# Patient Record
Sex: MALE | Race: BLACK OR AFRICAN AMERICAN | Employment: FULL TIME | URBAN - METROPOLITAN AREA
[De-identification: names, ages, dates, MRNs, and addresses within clinical notes are randomized per-mention and may not be internally consistent; named-entity substitution may affect disease eponyms.]

---

## 2017-01-12 ENCOUNTER — APPOINTMENT (OUTPATIENT)
Dept: GENERAL RADIOLOGY | Age: 52
End: 2017-01-12
Attending: EMERGENCY MEDICINE
Payer: COMMERCIAL

## 2017-01-12 ENCOUNTER — HOSPITAL ENCOUNTER (EMERGENCY)
Age: 52
Discharge: HOME | End: 2017-01-13
Attending: EMERGENCY MEDICINE
Payer: COMMERCIAL

## 2017-01-12 VITALS
TEMPERATURE: 98 F | WEIGHT: 295 LBS | SYSTOLIC BLOOD PRESSURE: 137 MMHG | BODY MASS INDEX: 34.83 KG/M2 | HEIGHT: 77 IN | OXYGEN SATURATION: 97 % | RESPIRATION RATE: 14 BRPM | HEART RATE: 60 BPM | DIASTOLIC BLOOD PRESSURE: 93 MMHG

## 2017-01-12 DIAGNOSIS — M19.90 ARTHRITIS: Primary | ICD-10-CM

## 2017-01-12 PROCEDURE — 73564 X-RAY EXAM KNEE 4 OR MORE: CPT

## 2017-01-12 PROCEDURE — 99283 EMERGENCY DEPT VISIT LOW MDM: CPT

## 2017-01-12 RX ORDER — IBUPROFEN 400 MG/1
800 TABLET ORAL
Status: COMPLETED | OUTPATIENT
Start: 2017-01-12 | End: 2017-01-13

## 2017-01-12 RX ORDER — IBUPROFEN 600 MG/1
600 TABLET ORAL
Qty: 30 TAB | Refills: 0 | Status: SHIPPED | OUTPATIENT
Start: 2017-01-12

## 2017-01-13 PROCEDURE — 74011250637 HC RX REV CODE- 250/637: Performed by: EMERGENCY MEDICINE

## 2017-01-13 RX ADMIN — IBUPROFEN 800 MG: 400 TABLET ORAL at 00:06

## 2017-01-13 NOTE — ED NOTES
I have reviewed discharge instructions with the patient. The patient verbalized understanding. Medication teaching given, to include name, dose, action, and side effects. Patient verbalized understanding of medications. Encouraged patient to voice any concerns with reassurance provided. Patient armband removed and shredded    Patient Discharged in stable condition.   Patient is awake, alert and oriented x 4

## 2017-01-13 NOTE — DISCHARGE INSTRUCTIONS
Arthritis: Care Instructions  Your Care Instructions  Arthritis, also called osteoarthritis, is a breakdown of the cartilage that cushions your joints. When the cartilage wears down, your bones rub against each other. This causes pain and stiffness. Many people have some arthritis as they age. Arthritis most often affects the joints of the spine, hands, hips, knees, or feet. You can take simple measures to protect your joints, ease your pain, and help you stay active. Follow-up care is a key part of your treatment and safety. Be sure to make and go to all appointments, and call your doctor if you are having problems. It's also a good idea to know your test results and keep a list of the medicines you take. How can you care for yourself at home? · Stay at a healthy weight. Being overweight puts extra strain on your joints. · Talk to your doctor or physical therapist about exercises that will help ease joint pain. ¨ Stretch. You may enjoy gentle forms of yoga to help keep your joints and muscles flexible. ¨ Walk instead of jog. Other types of exercise that are less stressful on the joints include riding a bicycle, swimming, or water exercise. ¨ Lift weights. Strong muscles help reduce stress on your joints. Stronger thigh muscles, for example, take some of the stress off of the knees and hips. Learn the right way to lift weights so you do not make joint pain worse. · Take your medicines exactly as prescribed. Call your doctor if you think you are having a problem with your medicine. · Take pain medicines exactly as directed. ¨ If the doctor gave you a prescription medicine for pain, take it as prescribed. ¨ If you are not taking a prescription pain medicine, ask your doctor if you can take an over-the-counter medicine. · Use a cane, crutch, walker, or another device if you need help to get around. These can help rest your joints.  You also can use other things to make life easier, such as a higher toilet seat and padded handles on kitchen utensils. · Do not sit in low chairs, which can make it hard to get up. · Put heat or cold on your sore joints as needed. Use whichever helps you most. You also can take turns with hot and cold packs. ¨ Apply heat 2 or 3 times a day for 20 to 30 minutes--using a heating pad, hot shower, or hot pack--to relieve pain and stiffness. ¨ Put ice or a cold pack on your sore joint for 10 to 20 minutes at a time. Put a thin cloth between the ice and your skin. When should you call for help? Call your doctor now or seek immediate medical care if:  · You have sudden swelling, warmth, or pain in any joint. · You have joint pain and a fever or rash. · You have such bad pain that you cannot use a joint. Watch closely for changes in your health, and be sure to contact your doctor if:  · You have mild joint symptoms that continue even with more than 6 weeks of care at home. · You have stomach pain or other problems with your medicine. Where can you learn more? Go to http://pilar-saurav.info/. Enter N343 in the search box to learn more about \"Arthritis: Care Instructions. \"  Current as of: February 24, 2016  Content Version: 11.1  © 5854-4184 [employer]. Care instructions adapted under license by [employer] (which disclaims liability or warranty for this information). If you have questions about a medical condition or this instruction, always ask your healthcare professional. [place] [address] any warranty or liability for your use of this information.

## 2017-01-13 NOTE — ED PROVIDER NOTES
HPI Comments: 11:52 PM Armand Dakin is a 54 y.o. male  presents to ED complaining of R knee pain that began a few weeks ago. Pt states that he was exercising on the treadmill, elliptical, and bikes and when finished his workout he noticed he R knee was in pain. Pt states that he has been taking Aleve and Tylenol but that it has provided little relief. Pt states that the pain is not that bad and is mostly exacerbated when his knee rest in a certain position for a long period of time. There are no further complaints at this time. PCP: Yair Vang MD      Patient is a 54 y.o. male presenting with knee pain. The history is provided by the patient. No  was used. Knee Pain    The problem has not changed since onset. Past Medical History:   Diagnosis Date    [facility] [address]     Atypical chest pain     Hypertension     Hypokalemia     Renal cell carcinoma of right kidney ([facility] [address] 12/23/14    Renal mass, right        Past Surgical History:   Procedure Laterality Date    Hx wisdom teeth extraction      Hx nephrectomy Right 12/23/14     Open Partial, Dr. Cintia Kaye, [facility]         Family History:   Problem Relation Age of Onset    Cancer Maternal Grandfather      Prostate    Diabetes Maternal Grandmother     Hypertension Mother        Social History     Social History    Marital status:      Spouse name: N/A    Number of children: N/A    Years of education: N/A     Occupational History    Not on file.      Social History Main Topics    Smoking status: Former Smoker     Packs/day: 0.05     Years: 1.00     Types: Cigarettes    Smokeless tobacco: Never Used    Alcohol use 0.0 - 0.5 oz/week     0 - 1 Standard drinks or equivalent per week      Comment: occasionally    Drug use: No    Sexual activity: Yes     Partners: Female     Other Topics Concern    Not on file     Social History Narrative         ALLERGIES: Gadolinium-containing contrast media    Review of Systems   Constitutional: Negative. HENT: Negative. Eyes: Negative. Respiratory: Negative. Cardiovascular: Negative. Gastrointestinal: Negative. Endocrine: Negative. Genitourinary: Negative. Musculoskeletal: Positive for arthralgias (R knee). Skin: Negative. Allergic/Immunologic: Negative. Neurological: Negative. Hematological: Negative. Psychiatric/Behavioral: Negative. All other systems reviewed and are negative. Vitals:    01/12/17 2322   BP: (!) 137/93   Pulse: 60   Resp: 14   Temp: 98 °F (36.7 °C)   SpO2: 97%   Weight: 133.8 kg (295 lb)   Height: 6' 5\" (1.956 m)            Physical Exam   Constitutional: He is oriented to person, place, and time. He appears well-developed and well-nourished. No distress. HENT:   Head: Normocephalic. Mouth/Throat: Oropharynx is clear and moist.   Eyes: Conjunctivae and EOM are normal. Pupils are equal, round, and reactive to light. Neck: Normal range of motion. Neck supple. Cardiovascular: Normal rate, regular rhythm, normal heart sounds and intact distal pulses. No murmur heard. Pulmonary/Chest: Effort normal and breath sounds normal. No respiratory distress. He has no wheezes. He has no rales. He exhibits no tenderness. Abdominal: Soft. Bowel sounds are normal. He exhibits no distension. There is no tenderness. There is no rebound. Musculoskeletal: He exhibits no edema or tenderness. Right knee: He exhibits normal range of motion, no swelling and no erythema. RIGHT KNEE: normal ROM, pulses, sensory and strength. No edema, tenderness with palpation or rash. Neurological: He is alert and oriented to person, place, and time. No cranial nerve deficit. He exhibits normal muscle tone. Coordination normal.   Skin: Skin is warm and dry. No rash noted. Psychiatric: He has a normal mood and affect.  His behavior is normal. Judgment and thought content normal.   Nursing note and vitals reviewed. MDM  Number of Diagnoses or Management Options  Arthritis: new and requires workup  Risk of Complications, Morbidity, and/or Mortality  Presenting problems: moderate  Diagnostic procedures: moderate  Management options: moderate      ED Course       Procedures        Vitals:  Patient Vitals for the past 12 hrs:   Temp Pulse Resp BP SpO2   01/12/17 2322 98 °F (36.7 °C) 60 14 (!) 137/93 97 %     Pulse ox interpreted WNL    Medications ordered:   Medications   ibuprofen (MOTRIN) tablet 800 mg (not administered)         Lab findings:  No results found for this or any previous visit (from the past 12 hour(s)). X-Ray, CT or other radiology findings or impressions:  XR KNEE RT MIN 4 V    (Results Pending)         Disposition:  Diagnosis:   1. Arthritis        Disposition: discharge    Follow-up Information     None           Patient's Medications   Start Taking    IBUPROFEN (MOTRIN) 600 MG TABLET    Take 1 Tab by mouth every six (6) hours as needed for Pain. Continue Taking    AMLODIPINE (NORVASC) 10 MG TABLET    Take  by mouth daily. ASPIRIN 81 MG CHEWABLE TABLET    Take 1 Tab by mouth daily. These Medications have changed    No medications on file   Stop Taking    ATENOLOL (TENORMIN) 50 MG TABLET    Take 100 mg by mouth daily. BENAZEPRIL (LOTENSIN) 40 MG TABLET    Take 40 mg by mouth daily. Scribe Attestation  Kelvin Finch acting as a scribe for and in the presence of Chuck Burnett MD  January 13, 2017 at 12:00 AM       Signed By: Yanelis Boyd, January 13, 2017 at 12:00 AM       Provider Attestation:  I personally performed the services described in the documentation, reviewed the documentation, as recorded by the scribe in my presence, and it accurately and completely records my words and actions.   Chuck Burnett MD

## 2017-01-29 ENCOUNTER — HOSPITAL ENCOUNTER (EMERGENCY)
Age: 52
Discharge: HOME | End: 2017-01-29
Attending: EMERGENCY MEDICINE | Admitting: EMERGENCY MEDICINE
Payer: COMMERCIAL

## 2017-01-29 VITALS
OXYGEN SATURATION: 99 % | DIASTOLIC BLOOD PRESSURE: 84 MMHG | SYSTOLIC BLOOD PRESSURE: 138 MMHG | HEIGHT: 77 IN | HEART RATE: 61 BPM | TEMPERATURE: 98 F | WEIGHT: 295 LBS | BODY MASS INDEX: 34.83 KG/M2 | RESPIRATION RATE: 16 BRPM

## 2017-01-29 DIAGNOSIS — K64.8 INTERNAL BLEEDING HEMORRHOIDS: Primary | ICD-10-CM

## 2017-01-29 PROCEDURE — 99282 EMERGENCY DEPT VISIT SF MDM: CPT

## 2017-01-29 RX ORDER — NAPROXEN 500 MG/1
500 TABLET ORAL 2 TIMES DAILY WITH MEALS
Qty: 20 TAB | Refills: 0 | Status: SHIPPED | OUTPATIENT
Start: 2017-01-29 | End: 2017-02-08

## 2017-01-29 NOTE — ED TRIAGE NOTES
Hemorrhoid - using preparation H. This morning blood on underwear - bright red, about the 3\" in diameter.

## 2017-01-29 NOTE — DISCHARGE INSTRUCTIONS

## 2017-01-29 NOTE — ED PROVIDER NOTES
HPI Comments: Patient is a 55 y/o male who presents to the ER c/o rectal bleeding. Patient states for the past week or so, he has had problems with hemorrhoids. Patient was seen by his PCP, and told to use preparation as needed. He has still been having some bleeding/spotting noted in his underwear regularly. Patient states he is having soft, regular bowel movements that are not painful. He denied any blood in his stool. He states the hemorrhoids are not very painful or itchy. Patient denied all other complaints. Patient is a 54 y.o. male presenting with hemorrhoids. The history is provided by the patient. Hemorrhoids    This is a new problem. The current episode started more than 1 week ago. Pertinent negatives include no abdominal pain, no dysuria, no chills, no fever, no nausea and no vomiting. Past Medical History:   Diagnosis Date    [facility]) ([facility] [address]     Atypical chest pain     Hypertension     Hypokalemia     Renal cell carcinoma of right kidney ([facility] [address] 12/23/14    Renal mass, right        Past Surgical History:   Procedure Laterality Date    Hx wisdom teeth extraction      Hx nephrectomy Right 12/23/14     [place] [id number]         Family History:   Problem Relation Age of Onset    Cancer Maternal Grandfather      Prostate    Diabetes Maternal Grandmother     Hypertension Mother        Social History     Social History    Marital status:      Spouse name: N/A    Number of children: N/A    Years of education: N/A     Occupational History    Not on file.      Social History Main Topics    Smoking status: Former Smoker     Packs/day: 0.05     Years: 1.00     Types: Cigarettes    Smokeless tobacco: Never Used    Alcohol use 0.0 - 0.5 oz/week     0 - 1 Standard drinks or equivalent per week      Comment: occasionally    Drug use: No    Sexual activity: Yes     Partners: Female     Other Topics Concern    Not on file     Social History Narrative         ALLERGIES: Gadolinium-containing contrast media    Review of Systems   Constitutional: Negative for chills, fatigue and fever. HENT: Negative. Negative for sore throat. Eyes: Negative. Respiratory: Negative for cough and shortness of breath. Cardiovascular: Negative for chest pain and palpitations. Gastrointestinal: Positive for anal bleeding, hemorrhoids and rectal pain. Negative for abdominal pain, nausea and vomiting. Genitourinary: Negative for dysuria. Musculoskeletal: Negative. Skin: Negative. Neurological: Negative for dizziness, weakness, light-headedness and headaches. Psychiatric/Behavioral: Negative. All other systems reviewed and are negative. Vitals:    01/29/17 1240   BP: 138/84   Pulse: 61   Resp: 16   Temp: 98 °F (36.7 °C)   SpO2: 99%   Weight: 133.8 kg (295 lb)   Height: 6' 5\" (1.956 m)            Physical Exam   Constitutional: He is oriented to person, place, and time. He appears well-developed and well-nourished. HENT:   Head: Normocephalic and atraumatic. Mouth/Throat: Oropharynx is clear and moist.   Eyes: Conjunctivae are normal. No scleral icterus. Neck: Neck supple. No JVD present. No tracheal deviation present. Cardiovascular: Normal rate, regular rhythm and normal heart sounds. Pulmonary/Chest: Effort normal and breath sounds normal. No respiratory distress. He has no wheezes. Abdominal: Soft. There is no tenderness. Genitourinary: Rectal exam shows internal hemorrhoid. Genitourinary Comments: Suspected grade 2/3 internal hemorrhoid visualized on anal exam with scant bleeding; non tender to touch   Musculoskeletal: Normal range of motion. Neurological: He is alert and oriented to person, place, and time. He has normal strength. Gait normal.   Skin: Skin is warm and dry. Psychiatric: He has a normal mood and affect. Nursing note and vitals reviewed.        MDM  Number of Diagnoses or Management Options  Diagnosis management comments: 2:12 PM  53 y/o male c/o rectal bleeding/new onset of hemorrhoids. Pt seen at pcp last week and has been using preparation H as needed with little relief. Has not had colonoscopy yet. Will give referral.  All questions answered and patient in agreement with plan of care. Will plan for discharge. Gregor Galeano PA-C    Clinical Impression:  Internal hemorrhoids    Risk of Complications, Morbidity, and/or Mortality  Presenting problems: low  Diagnostic procedures: low  Management options: low    Critical Care  Total time providing critical care: < 30 minutes    Patient Progress  Patient progress: stable    ED Course       Procedures             I was personally available for consultation in the emergency department. I have reviewed the chart prior to the patient's discharge and agree with the documentation recorded by the [facility], including the assessment, treatment plan, and disposition.

## 2017-04-24 ENCOUNTER — OFFICE VISIT (OUTPATIENT)
Dept: ORTHOPEDIC SURGERY | Facility: CLINIC | Age: 52
End: 2017-04-24

## 2017-04-24 VITALS
TEMPERATURE: 98 F | SYSTOLIC BLOOD PRESSURE: 143 MMHG | DIASTOLIC BLOOD PRESSURE: 89 MMHG | HEART RATE: 70 BPM | WEIGHT: 300 LBS | BODY MASS INDEX: 35.57 KG/M2

## 2017-04-24 DIAGNOSIS — M22.41 CHONDROMALACIA PATELLAE OF RIGHT KNEE: ICD-10-CM

## 2017-04-24 DIAGNOSIS — M25.461 KNEE EFFUSION, RIGHT: ICD-10-CM

## 2017-04-24 DIAGNOSIS — M25.561 RIGHT KNEE PAIN, UNSPECIFIED CHRONICITY: ICD-10-CM

## 2017-04-24 DIAGNOSIS — M17.11 PRIMARY OSTEOARTHRITIS OF RIGHT KNEE: Primary | ICD-10-CM

## 2017-04-24 DIAGNOSIS — E66.9 OBESITY (BMI 30.0-34.9): ICD-10-CM

## 2017-04-24 RX ORDER — BUPIVACAINE HYDROCHLORIDE 2.5 MG/ML
4 INJECTION, SOLUTION EPIDURAL; INFILTRATION; INTRACAUDAL ONCE
Qty: 4 ML | Refills: 0
Start: 2017-04-24 | End: 2017-04-24

## 2017-04-24 RX ORDER — BETAMETHASONE SODIUM PHOSPHATE AND BETAMETHASONE ACETATE 3; 3 MG/ML; MG/ML
3 INJECTION, SUSPENSION INTRA-ARTICULAR; INTRALESIONAL; INTRAMUSCULAR; SOFT TISSUE ONCE
Qty: 0.5 ML | Refills: 0
Start: 2017-04-24 | End: 2017-04-24

## 2017-04-24 NOTE — MR AVS SNAPSHOT
Visit Information Date & Time Provider Department Dept. Phone Encounter #  
 4/24/2017 10:00 AM Je Moses, [address] [facility] [place] [address] [phone] [id number] Follow-up Instructions Return if symptoms worsen or fail to improve. Upcoming Health Maintenance Date Due Pneumococcal 19-64 Highest Risk (1 of 3 - PCV13) 5/3/1986 DTaP/Tdap/Td series (1 - Tdap) 5/3/1988 INFLUENZA AGE 9 TO ADULT 8/1/2016 Allergies as of 4/24/2017  Review Complete On: 4/24/2017 By: Meenu Kwok Severity Noted Reaction Type Reactions Gadolinium-containing Contrast Media  06/12/2014   Not Verified Itching, Unknown (comments) Pt had itching, nausea, perfuse sweating, lightheadedness----taken to ed for evaluation Current Immunizations  Never Reviewed No immunizations on file. Not reviewed this visit You Were Diagnosed With   
  
 Codes Comments Primary osteoarthritis of right knee    -  Primary ICD-10-CM: M17.11 ICD-9-CM: 715.16 Mild, medial compartment Knee effusion, right     ICD-10-CM: M25.461 ICD-9-CM: 719.06 Small suprapatellar Right knee pain, unspecified chronicity     ICD-10-CM: M25.561 ICD-9-CM: 719.46 Obesity (BMI 30.0-34.9)     ICD-10-CM: O01.2 ICD-9-CM: 278.00 Vitals BP Pulse Temp Weight(growth percentile) BMI Smoking Status 143/89 (BP 1 Location: Left arm, BP Patient Position: Sitting) 70 98 °F (36.7 °C) (Oral) 300 lb (136.1 kg) 35.57 kg/m2 Former Smoker Vitals History BMI and BSA Data Body Mass Index Body Surface Area 35.57 kg/m 2 2.72 m 2 Preferred Pharmacy Pharmacy Name Phone [facility] [address] [facility] Your Updated Medication List  
  
   
This list is accurate as of: 4/24/17 10:26 AM.  Always use your most recent med list. amLODIPine 10 mg tablet Commonly known as:  Trena Hollis Take  by mouth daily. aspirin 81 mg chewable tablet Take 1 Tab by mouth daily. benzocaine 10 % Oint 1 g by Apply Externally route three (3) times daily as needed. ibuprofen 600 mg tablet Commonly known as:  MOTRIN Take 1 Tab by mouth every six (6) hours as needed for Pain. Follow-up Instructions Return if symptoms worsen or fail to improve. Patient Instructions Knee Arthritis: Exercises Your Care Instructions Here are some examples of exercises for knee arthritis. Start each exercise slowly. Ease off the exercise if you start to have pain. Your doctor or physical therapist will tell you when you can start these exercises and which ones will work best for you. How to do the exercises Knee flexion with heel slide 1. Lie on your back with your knees bent. 2. Slide your heel back by bending your affected knee as far as you can. Then hook your other foot around your ankle to help pull your heel even farther back. 3. Hold for about 6 seconds, then rest for up to 10 seconds. 4. Repeat 8 to 12 times. 5. Switch legs and repeat steps 1 through 4, even if only one knee is sore. ModaMi 1. Sit with your affected leg straight and supported on the floor or a firm bed. Place a small, rolled-up towel under your knee. Your other leg should be bent, with that foot flat on the floor. 2. Tighten the thigh muscles of your affected leg by pressing the back of your knee down into the towel. 3. Hold for about 6 seconds, then rest for up to 10 seconds. 4. Repeat 8 to 12 times. 5. Switch legs and repeat steps 1 through 4, even if only one knee is sore. Straight-leg raises to the front 1. Lie on your back with your good knee bent so that your foot rests flat on the floor. Your affected leg should be straight. Make sure that your low back has a normal curve.  You should be able to slip your hand in between the floor and the small of your back, with your palm touching the floor and your back touching the back of your hand. 2. Tighten the thigh muscles in your affected leg by pressing the back of your knee flat down to the floor. Hold your knee straight. 3. Keeping the thigh muscles tight and your leg straight, lift your affected leg up so that your heel is about 12 inches off the floor. Hold for about 6 seconds, then lower slowly. 4. Relax for up to 10 seconds between repetitions. 5. Repeat 8 to 12 times. 6. Switch legs and repeat steps 1 through 5, even if only one knee is sore. Active knee flexion 1. Lie on your stomach with your knees straight. If your kneecap is uncomfortable, roll up a washcloth and put it under your leg just above your kneecap. 2. Lift the foot of your affected leg by bending the knee so that you bring the foot up toward your buttock. If this motion hurts, try it without bending your knee quite as far. This may help you avoid any painful motion. 3. Slowly move your leg up and down. 4. Repeat 8 to 12 times. 5. Switch legs and repeat steps 1 through 4, even if only one knee is sore. Quadriceps stretch (facedown) 1. Lie flat on your stomach, and rest your face on the floor. 2. Wrap a towel or belt strap around the lower part of your affected leg. Then use the towel or belt strap to slowly pull your heel toward your buttock until you feel a stretch. 3. Hold for about 15 to 30 seconds, then relax your leg against the towel or belt strap. 4. Repeat 2 to 4 times. 5. Switch legs and repeat steps 1 through 4, even if only one knee is sore. Stationary exercise bike If you do not have a stationary exercise bike at home, you can find one to ride at your local health club or community center. 1. Adjust the height of the bike seat so that your knee is slightly bent when your leg is extended downward.  If your knee hurts when the pedal reaches the top, you can raise the seat so that your knee does not bend as much. 2. Start slowly. At first, try to do 5 to 10 minutes of cycling with little to no resistance. Then increase your time and the resistance bit by bit until you can do 20 to 30 minutes without pain. 3. If you start to have pain, rest your knee until your pain gets back to the level that is normal for you. Or cycle for less time or with less effort. Follow-up care is a key part of your treatment and safety. Be sure to make and go to all appointments, and call your doctor if you are having problems. It's also a good idea to know your test results and keep a list of the medicines you take. Where can you learn more? Go to http://pilar-saurav.info/. Enter C159 in the search box to learn more about \"Knee Arthritis: Exercises. \" Current as of: May 23, 2016 Content Version: 11.2 © 9333-2149 [employer]. Care instructions adapted under license by [employer] (which disclaims liability or warranty for this information). If you have questions about a medical condition or this instruction, always ask your healthcare professional. [address] any warranty or liability for your use of this information. Joint Injections: Care Instructions Your Care Instructions Joint injections are shots into a joint, such as the knee. They may be used to put in medicines, such as pain relievers. Or they can be used to take out fluid. Sometimes the fluid is tested in a lab. This can help find the cause of a joint problem. A corticosteroid, or steroid, shot is used to reduce inflammation in tendons or joints. It is often used to treat problems such as arthritis, tendinitis, and bursitis. Steroids can be injected directly into a painful, inflamed joint. They can also help reduce inflammation of a bursa. A bursa is a sac of fluid.  It cushions and lubricates areas where tendons, ligaments, skin, muscles, or bones rub against each other. A steroid shot can sometimes help with short-term pain relief when other treatments haven't worked. If steroid shots help, pain may improve for weeks or months. Follow-up care is a key part of your treatment and safety. Be sure to make and go to all appointments, and call your doctor if you are having problems. It's also a good idea to know your test results and keep a list of the medicines you take. How can you care for yourself at home? · Put ice or a cold pack on the area for 10 to 20 minutes at a time. Put a thin cloth between the ice and your skin. · Take anti-inflammatory medicines to reduce pain, swelling, or inflammation. These include ibuprofen (Advil, Motrin) and naproxen (Aleve). Read and follow all instructions on the label. · Avoid strenuous activities for several days, especially those that put stress on the area where you got the shot. · If you have dressings over the area, keep them clean and dry. You may remove them when your doctor tells you to. When should you call for help? Call your doctor now or seek immediate medical care if: 
· You have signs of infection, such as: 
¨ Increased pain, swelling, warmth, or redness. ¨ Red streaks leading from the site. ¨ Pus draining from the site. ¨ A fever. Watch closely for changes in your health, and be sure to contact your doctor if you have any problems. Where can you learn more? Go to http://pilar-saurav.info/. Enter N616 in the search box to learn more about \"Joint Injections: Care Instructions. \" Current as of: May 23, 2016 Content Version: 11.2 © 8768-7397 [employer]. Care instructions adapted under license by [employer] (which disclaims liability or warranty for this information).  If you have questions about a medical condition or this instruction, always ask your healthcare professional. [address] any warranty or liability for your use of this information. Introducing [facility]! Paris Mendenhall introduces Emu Messenger patient portal. Now you can access parts of your medical record, email your doctor's office, and request medication refills online. 1. In your internet browser, go to [email or web address]://[employer]. [employer]/Memampt 2. Click on the First Time User? Click Here link in the Sign In box. You will see the New Member Sign Up page. 3. Enter your Emu Messenger Access Code exactly as it appears below. You will not need to use this code after youve completed the sign-up process. If you do not sign up before the expiration date, you must request a new code. · Emu Messenger Access Code: [email or web address] Expires: 7/23/2017 10:26 AM 
 
4. Enter the last four digits of your Social Security Number (xxxx) and Date of Birth (mm/dd/yyyy) as indicated and click Submit. You will be taken to the next sign-up page. 5. Create a Emu Messenger ID. This will be your Emu Messenger login ID and cannot be changed, so think of one that is secure and easy to remember. 6. Create a Emu Messenger password. You can change your password at any time. 7. Enter your Password Reset Question and Answer. This can be used at a later time if you forget your password. 8. Enter your e-mail address. You will receive e-mail notification when new information is available in [address] 9. Click Sign Up. You can now view and download portions of your medical record. 10. Click the Download Summary menu link to download a portable copy of your medical information. If you have questions, please visit the Frequently Asked Questions section of the Emu Messenger website. Remember, Emu Messenger is NOT to be used for urgent needs. For medical emergencies, dial 911. Now available from your iPhone and Android! Please provide this summary of care documentation to your next provider. Your primary care clinician is listed as [facility] Favorite. If you have any questions after today's visit, please call [phone].

## 2017-04-24 NOTE — PATIENT INSTRUCTIONS
Knee Arthritis: Exercises  Your Care Instructions  Here are some examples of exercises for knee arthritis. Start each exercise slowly. Ease off the exercise if you start to have pain. Your doctor or physical therapist will tell you when you can start these exercises and which ones will work best for you. How to do the exercises  Knee flexion with heel slide    1. Lie on your back with your knees bent. 2. Slide your heel back by bending your affected knee as far as you can. Then hook your other foot around your ankle to help pull your heel even farther back. 3. Hold for about 6 seconds, then rest for up to 10 seconds. 4. Repeat 8 to 12 times. 5. Switch legs and repeat steps 1 through 4, even if only one knee is sore. Quad sets    1. Sit with your affected leg straight and supported on the floor or a firm bed. Place a small, rolled-up towel under your knee. Your other leg should be bent, with that foot flat on the floor. 2. Tighten the thigh muscles of your affected leg by pressing the back of your knee down into the towel. 3. Hold for about 6 seconds, then rest for up to 10 seconds. 4. Repeat 8 to 12 times. 5. Switch legs and repeat steps 1 through 4, even if only one knee is sore. Straight-leg raises to the front    1. Lie on your back with your good knee bent so that your foot rests flat on the floor. Your affected leg should be straight. Make sure that your low back has a normal curve. You should be able to slip your hand in between the floor and the small of your back, with your palm touching the floor and your back touching the back of your hand. 2. Tighten the thigh muscles in your affected leg by pressing the back of your knee flat down to the floor. Hold your knee straight. 3. Keeping the thigh muscles tight and your leg straight, lift your affected leg up so that your heel is about 12 inches off the floor. Hold for about 6 seconds, then lower slowly.   4. Relax for up to 10 seconds between repetitions. 5. Repeat 8 to 12 times. 6. Switch legs and repeat steps 1 through 5, even if only one knee is sore. Active knee flexion    1. Lie on your stomach with your knees straight. If your kneecap is uncomfortable, roll up a washcloth and put it under your leg just above your kneecap. 2. Lift the foot of your affected leg by bending the knee so that you bring the foot up toward your buttock. If this motion hurts, try it without bending your knee quite as far. This may help you avoid any painful motion. 3. Slowly move your leg up and down. 4. Repeat 8 to 12 times. 5. Switch legs and repeat steps 1 through 4, even if only one knee is sore. Quadriceps stretch (facedown)    1. Lie flat on your stomach, and rest your face on the floor. 2. Wrap a towel or belt strap around the lower part of your affected leg. Then use the towel or belt strap to slowly pull your heel toward your buttock until you feel a stretch. 3. Hold for about 15 to 30 seconds, then relax your leg against the towel or belt strap. 4. Repeat 2 to 4 times. 5. Switch legs and repeat steps 1 through 4, even if only one knee is sore. Stationary exercise bike    If you do not have a stationary exercise bike at home, you can find one to ride at your local health club or community center. 1. Adjust the height of the bike seat so that your knee is slightly bent when your leg is extended downward. If your knee hurts when the pedal reaches the top, you can raise the seat so that your knee does not bend as much. 2. Start slowly. At first, try to do 5 to 10 minutes of cycling with little to no resistance. Then increase your time and the resistance bit by bit until you can do 20 to 30 minutes without pain. 3. If you start to have pain, rest your knee until your pain gets back to the level that is normal for you. Or cycle for less time or with less effort. Follow-up care is a key part of your treatment and safety.  Be sure to make and go to all appointments, and call your doctor if you are having problems. It's also a good idea to know your test results and keep a list of the medicines you take. Where can you learn more? Go to http://pilar-saurav.info/. Enter C159 in the search box to learn more about \"Knee Arthritis: Exercises. \"  Current as of: May 23, 2016  Content Version: 11.2  © 20061139-3323 [employer]. Care instructions adapted under license by [employer] (which disclaims liability or warranty for this information). If you have questions about a medical condition or this instruction, always ask your healthcare professional. [place] [address] any warranty or liability for your use of this information. Joint Injections: Care Instructions  Your Care Instructions  Joint injections are shots into a joint, such as the knee. They may be used to put in medicines, such as pain relievers. Or they can be used to take out fluid. Sometimes the fluid is tested in a lab. This can help find the cause of a joint problem. A corticosteroid, or steroid, shot is used to reduce inflammation in tendons or joints. It is often used to treat problems such as arthritis, tendinitis, and bursitis. Steroids can be injected directly into a painful, inflamed joint. They can also help reduce inflammation of a bursa. A bursa is a sac of fluid. It cushions and lubricates areas where tendons, ligaments, skin, muscles, or bones rub against each other. A steroid shot can sometimes help with short-term pain relief when other treatments haven't worked. If steroid shots help, pain may improve for weeks or months. Follow-up care is a key part of your treatment and safety. Be sure to make and go to all appointments, and call your doctor if you are having problems. It's also a good idea to know your test results and keep a list of the medicines you take. How can you care for yourself at home?   · Put ice or a cold pack on the area for 10 to 20 minutes at a time. Put a thin cloth between the ice and your skin. · Take anti-inflammatory medicines to reduce pain, swelling, or inflammation. These include ibuprofen (Advil, Motrin) and naproxen (Aleve). Read and follow all instructions on the label. · Avoid strenuous activities for several days, especially those that put stress on the area where you got the shot. · If you have dressings over the area, keep them clean and dry. You may remove them when your doctor tells you to. When should you call for help? Call your doctor now or seek immediate medical care if:  · You have signs of infection, such as:  ¨ Increased pain, swelling, warmth, or redness. ¨ Red streaks leading from the site. ¨ Pus draining from the site. ¨ A fever. Watch closely for changes in your health, and be sure to contact your doctor if you have any problems. Where can you learn more? Go to http://pilar-saurav.info/. Enter N616 in the search box to learn more about \"Joint Injections: Care Instructions. \"  Current as of: May 23, 2016  Content Version: 11.2  © 9525-1673 [employer]. Care instructions adapted under license by [employer] (which disclaims liability or warranty for this information). If you have questions about a medical condition or this instruction, always ask your healthcare professional. [address] any warranty or liability for your use of this information.

## 2017-04-24 NOTE — PROGRESS NOTES
Patient: Jessie Dubon                MRN: [id number]       SSN: [id number]  YOB: 1965        AGE: 54 y.o. SEX: male    PCP: Belen Gardner MD  04/24/17    Chief Complaint   Patient presents with    Knee Pain     Right     HISTORY:  Jessie Dubon is a 54 y.o. male who is seen for right knee pain. He reports mostly medial right knee pain for the past 6 months. He had knee x rays taken at the [facility] ED. He states that he has been working out since June of 2016 and had increased knee pain while using an incline treadmill at the gym. He reports increased knee pain and stiffness at night and with holding his knee in flexion for long periods of time. He takes ibuprofen and Aleve for knee pain with benefit for knee pain. Pain Assessment  4/24/2017   Location of Pain Knee   Location Modifiers Right   Severity of Pain 5   Quality of Pain Dull;Aching   Duration of Pain Persistent   Frequency of Pain Several times daily   Aggravating Factors Bending   Limiting Behavior Some   Relieving Factors Rest     Occupation, etc:  Mr. Rani Ramos works as a  for [employer] for [employer] and has an  background. He lives with his wife, 14-year-old son, and 14-year-old daughter in the [place] [address]. His son works at [employer] and his daughter is currently studying sociology at [employer]. He has high blood pressure. He is not diabetic. He reports that he has lost 60 pounds over the past 10 months which he attributes to working out and eating healthier foods. Current weight is 300 pounds. He is 6'5\" tall. His PCP is Dr. Humera Ward. He reports a family h/o arthritis. He works out at [employer] and has a .       Weight Metrics 4/24/2017 1/29/2017 1/12/2017 1/28/2015 8/20/2014 7/9/2014 6/12/2014   Weight 300 lb 295 lb 295 lb 320 lb 315 lb 315 lb 321 lb   BMI 35.57 kg/m2 34.98 kg/m2 34.98 kg/m2 37.94 kg/m2 37.35 kg/m2 37.35 kg/m2 38.06 kg/m2     Patient Active Problem List   Diagnosis Code    Obstructive sleep apnea (adult) (pediatric) G47.33    Obstructive sleep apnea (adult) (pediatric) G47.33    Elevated troponin R74.8    Allergic reaction T78.40XA    Vasovagal episode R55    Troponin level elevated R74.8    Kidney mass N28.89    Hypotension I95.9    Renal cancer (HCC) C64.9    Obesity (BMI 30.0-34. 9) E66.9     REVIEW OF SYSTEMS: All Below are Negative except: See HPI   Constitutional: negative for fever, chills, and weight loss. Cardiovascular: negative for chest pain, claudication, leg swelling, SOB, TURK   Gastrointestinal: Negative for pain, N/V/C/D, Blood in stool or urine, dysuria,  hematuria, incontinence, pelvic pain. Musculoskeletal: See HPI   Neurological: Negative for dizziness and weakness. Negative for headaches, Visual changes, confusion, seizures   Phychiatric/Behavioral: Negative for depression, memory loss, substance  abuse. Extremities: Negative for hair changes, rash, or skin lesion changes. Hematologic: Negative for bleeding problems, bruising, pallor or swollen lymph  nodes   Peripheral Vascular: No calf pain, no circulation deficits. Social History     Social History    Marital status:      Spouse name: N/A    Number of children: N/A    Years of education: N/A     Occupational History    Not on file.      Social History Main Topics    Smoking status: Former Smoker     Packs/day: 0.05     Years: 1.00     Types: Cigarettes    Smokeless tobacco: Never Used    Alcohol use 0.0 - 0.5 oz/week     0 - 1 Standard drinks or equivalent per week      Comment: occasionally    Drug use: No    Sexual activity: Yes     Partners: Female     Other Topics Concern    Not on file     Social History Narrative      Allergies   Allergen Reactions    Gadolinium-Containing Contrast Media Itching and Unknown (comments)     Pt had itching, nausea, perfuse sweating, lightheadedness----taken to ed for evaluation      Current Outpatient Prescriptions   Medication Sig    betamethasone (CELESTONE SOLUSPAN) 6 mg/mL injection 0.5 mL by Intra artICUlar route once for 1 dose.  bupivacaine, PF, (MARCAINE, PF,) 0.25 % (2.5 mg/mL) injection 4 mL by Intra artICUlar route once for 1 dose.  aspirin 81 mg chewable tablet Take 1 Tab by mouth daily.  amLODIPine (NORVASC) 10 mg tablet Take  by mouth daily.  benzocaine 10 % oint 1 g by Apply Externally route three (3) times daily as needed.  ibuprofen (MOTRIN) 600 mg tablet Take 1 Tab by mouth every six (6) hours as needed for Pain. No current facility-administered medications for this visit. PHYSICAL EXAMINATION:  Visit Vitals    /89 (BP 1 Location: Left arm, BP Patient Position: Sitting)    Pulse 70    Temp 98 °F (36.7 °C) (Oral)    Wt 300 lb (136.1 kg)    BMI 35.57 kg/m2      ORTHO EXAMINATION:  Examination Right knee Left knee   Skin Intact Intact   Range of motion 110+5 120-0   Effusion - -   Medial joint line tenderness + +   Lateral joint line tenderness - -   Popliteal tenderness - -   Osteophytes palpable +, moderate medial -   Brians - -   Patella crepitus + +   Anterior drawer - -   Lateral laxity - -   Medial laxity - -   Varus deformity - -   Valgus deformity - -   Pretibial edema - -   Calf tenderness - -     PROCEDURE:  After discussing treatment options, patient's right knee was injected with 4 cc Marcaine and 1/2 cc Celestone.     Chart reviewed for the following:   Eloise Hinson MD, have reviewed the History, Physical and updated the Allergic reactions for [address] [place] [address] performed immediately prior to start of procedure:  Eloise Hinson MD, have performed the following reviews on Aliyah Bingham prior to the start of the procedure:            * Patient was identified by name and date of birth   * Agreement on procedure being performed was verified  * Risks and Benefits explained to the patient  * Procedure site verified and marked as necessary  * Patient was positioned for comfort  * Consent was obtained     Time: 10:24 AM     Date of procedure: 4/24/2017    Procedure performed by:  Halina Pugh MD    Mr. Segun Armstrong tolerated the procedure well with no complications. RADIOGRAPHS:  XR RIGHT KNEE 1/12/17  IMPRESSION:  No fracture or dislocation. Small suprapatellar joint effusion. IMPRESSION:      ICD-10-CM ICD-9-CM    1. Primary osteoarthritis of right knee M17.11 715.16 betamethasone (CELESTONE SOLUSPAN) 6 mg/mL injection      BETAMETHASONE ACETATE & SODIUM PHOSPHATE INJECTION 3 MG EA.      DRAIN/INJECT LARGE JOINT/BURSA      bupivacaine, PF, (MARCAINE, PF,) 0.25 % (2.5 mg/mL) injection    Mild, medial compartment   2. Knee effusion, right M25.461 719.06     Small suprapatellar   3. Right knee pain, unspecified chronicity M25.561 719.46 betamethasone (CELESTONE SOLUSPAN) 6 mg/mL injection      BETAMETHASONE ACETATE & SODIUM PHOSPHATE INJECTION 3 MG EA.      DRAIN/INJECT LARGE JOINT/BURSA      bupivacaine, PF, (MARCAINE, PF,) 0.25 % (2.5 mg/mL) injection   4. Obesity (BMI 30.0-34. 9) E66.9 278.00    5. Chondromalacia patellae of right knee M22.41 717.7      PLAN:  After discussing treatment options, patient's right knee was injected with 4 cc Marcaine and 1/2 cc Celestone. He will follow up as needed. We discussed a possible right knee MRI in the future if pain continues. He will follow up with his primary care physician for high blood pressure.       Scribed by Glance ([address]) as dictated by Halina Pugh MD

## 2017-11-09 ENCOUNTER — OFFICE VISIT (OUTPATIENT)
Dept: ORTHOPEDIC SURGERY | Age: 52
End: 2017-11-09

## 2017-11-09 VITALS
RESPIRATION RATE: 16 BRPM | BODY MASS INDEX: 35.42 KG/M2 | WEIGHT: 300 LBS | DIASTOLIC BLOOD PRESSURE: 110 MMHG | OXYGEN SATURATION: 99 % | SYSTOLIC BLOOD PRESSURE: 156 MMHG | HEIGHT: 77 IN | HEART RATE: 66 BPM

## 2017-11-09 DIAGNOSIS — M19.011 PRIMARY OSTEOARTHRITIS OF BOTH SHOULDERS: Primary | ICD-10-CM

## 2017-11-09 DIAGNOSIS — M25.511 ACUTE PAIN OF BOTH SHOULDERS: ICD-10-CM

## 2017-11-09 DIAGNOSIS — M75.41 IMPINGEMENT SYNDROME, SHOULDER, RIGHT: ICD-10-CM

## 2017-11-09 DIAGNOSIS — M75.42 SHOULDER IMPINGEMENT SYNDROME, LEFT: ICD-10-CM

## 2017-11-09 DIAGNOSIS — M19.012 PRIMARY OSTEOARTHRITIS OF BOTH SHOULDERS: Primary | ICD-10-CM

## 2017-11-09 DIAGNOSIS — M25.512 ACUTE PAIN OF BOTH SHOULDERS: ICD-10-CM

## 2017-11-09 RX ORDER — AMLODIPINE AND BENAZEPRIL HYDROCHLORIDE 5; 10 MG/1; MG/1
1 CAPSULE ORAL DAILY
COMMUNITY

## 2017-11-09 RX ORDER — TRIAMCINOLONE ACETONIDE 40 MG/ML
40 INJECTION, SUSPENSION INTRA-ARTICULAR; INTRAMUSCULAR ONCE
Qty: 1 ML | Refills: 0
Start: 2017-11-09 | End: 2017-11-09

## 2017-11-09 RX ORDER — MELOXICAM 15 MG/1
15 TABLET ORAL
Qty: 30 TAB | Refills: 1 | Status: SHIPPED | OUTPATIENT
Start: 2017-11-09

## 2017-11-09 NOTE — PATIENT INSTRUCTIONS
Arthritis: Care Instructions  Your Care Instructions  Arthritis, also called osteoarthritis, is a breakdown of the cartilage that cushions your joints. When the cartilage wears down, your bones rub against each other. This causes pain and stiffness. Many people have some arthritis as they age. Arthritis most often affects the joints of the spine, hands, hips, knees, or feet. You can take simple measures to protect your joints, ease your pain, and help you stay active. Follow-up care is a key part of your treatment and safety. Be sure to make and go to all appointments, and call your doctor if you are having problems. It's also a good idea to know your test results and keep a list of the medicines you take. How can you care for yourself at home? · Stay at a healthy weight. Being overweight puts extra strain on your joints. · Talk to your doctor or physical therapist about exercises that will help ease joint pain. ¨ Stretch. You may enjoy gentle forms of yoga to help keep your joints and muscles flexible. ¨ Walk instead of jog. Other types of exercise that are less stressful on the joints include riding a bicycle, swimming, alicia chi, or water exercise. ¨ Lift weights. Strong muscles help reduce stress on your joints. Stronger thigh muscles, for example, take some of the stress off of the knees and hips. Learn the right way to lift weights so you do not make joint pain worse. · Take your medicines exactly as prescribed. Call your doctor if you think you are having a problem with your medicine. · Take pain medicines exactly as directed. ¨ If the doctor gave you a prescription medicine for pain, take it as prescribed. ¨ If you are not taking a prescription pain medicine, ask your doctor if you can take an over-the-counter medicine. · Use a cane, crutch, walker, or another device if you need help to get around. These can help rest your joints.  You also can use other things to make life easier, such as a higher toilet seat and padded handles on kitchen utensils. · Do not sit in low chairs, which can make it hard to get up. · Put heat or cold on your sore joints as needed. Use whichever helps you most. You also can take turns with hot and cold packs. ¨ Apply heat 2 or 3 times a day for 20 to 30 minutes-using a heating pad, hot shower, or hot pack-to relieve pain and stiffness. ¨ Put ice or a cold pack on your sore joint for 10 to 20 minutes at a time. Put a thin cloth between the ice and your skin. When should you call for help? Call your doctor now or seek immediate medical care if:  ? · You have sudden swelling, warmth, or pain in any joint. ? · You have joint pain and a fever or rash. ? · You have such bad pain that you cannot use a joint. ? Watch closely for changes in your health, and be sure to contact your doctor if:  ? · You have mild joint symptoms that continue even with more than 6 weeks of care at home. ? · You have stomach pain or other problems with your medicine. Where can you learn more? Go to http://pilar-saurav.info/. Enter O547 in the search box to learn more about \"Arthritis: Care Instructions. \"  Current as of: October 31, 2016  Content Version: 11.4  © 5113-4010 [employer]. Care instructions adapted under license by [employer] (which disclaims liability or warranty for this information). If you have questions about a medical condition or this instruction, always ask your healthcare professional. [place] [address] any warranty or liability for your use of this information.

## 2017-11-09 NOTE — PROGRESS NOTES
Dae Live  1965   Chief Complaint   Patient presents with    Shoulder Pain     bilateral        HISTORY OF PRESENT ILLNESS  Dae Live is a 50 y.o. male who presents today for evaluation of B/L shoulder pain. he rates his pain 2/10 today. Pain has been gradually worsening. Has stopped working out and feel the pain has actually worsened. He states the shoulder take turns hurting worse. Patient describes the pain as aching and soreness that is Constant in nature. Symptoms are worse with overhead acitvity and is better with  Rest. Associated symptoms include some mild stiffness. Since problem started, it: has worsened. Pain does wake patient up at night. Has taken no medication for the problem. Works as an  for the [employer]. Has tried following treatments: Injections:NO; Brace:NO; Therapy:NO; Cane/Crutch:NO       Allergies   Allergen Reactions    Gadolinium-Containing Contrast Media Itching and Unknown (comments)     Pt had itching, nausea, perfuse sweating, lightheadedness----taken to ed for evaluation        Past Medical History:   Diagnosis Date    [facility]     Atypical chest pain     Hypertension     Hypokalemia     Obesity (BMI 30.0-34. 9) 4/24/2017    Renal cell carcinoma of right kidney ([facility] 12/23/14    Renal mass, right       Social History     Social History    Marital status:      Spouse name: N/A    Number of children: N/A    Years of education: N/A     Occupational History    Not on file.      Social History Main Topics    Smoking status: Former Smoker     Packs/day: 0.05     Years: 1.00     Types: Cigarettes    Smokeless tobacco: Never Used    Alcohol use 0.0 - 0.5 oz/week     0 - 1 Standard drinks or equivalent per week      Comment: occasionally    Drug use: No    Sexual activity: Yes     Partners: Female     Other Topics Concern    Not on file     Social History Narrative      Past Surgical History:   Procedure Laterality Date    HX NEPHRECTOMY Right 12/23/14    Open [facility], [facility]. Luann Mc[facility]on, [facility]    HX WISDOM TEETH EXTRACTION        Family History   Problem Relation Age of Onset    Cancer Maternal Grandfather      Prostate    Diabetes Maternal Grandmother     Hypertension Mother       Current Outpatient Prescriptions   Medication Sig    amLODIPine-benazepril (LOTREL) 5-10 mg per capsule Take 1 Cap by mouth daily.  triamcinolone acetonide (KENALOG) 40 mg/mL injection 1 mL by IntraMUSCular route once for 1 dose.  meloxicam (MOBIC) 15 mg tablet Take 1 Tab by mouth daily (with breakfast).  ibuprofen (MOTRIN) 600 mg tablet Take 1 Tab by mouth every six (6) hours as needed for Pain.  aspirin 81 mg chewable tablet Take 1 Tab by mouth daily.  benzocaine 10 % oint 1 g by Apply Externally route three (3) times daily as needed. (Patient not taking: Reported on 11/9/2017)    amLODIPine (NORVASC) 10 mg tablet Take  by mouth daily. No current facility-administered medications for this visit. REVIEW OF SYSTEM   Patient denies: Weight loss, Fever/Chills, HA, Visual changes, Fatigue, Chest pain, SOB, Abdominal pain, N/V/D/C, Blood in stool or urine, Edema. Pertinent positive as above in HPI. All others were negative    PHYSICAL EXAM:   Visit Vitals    BP (!) 156/110 (BP 1 Location: Left arm, BP Patient Position: Sitting)    Pulse 66    Resp 16    Ht 6' 5\" (1.956 m)    Wt 300 lb (136.1 kg)    SpO2 99%    BMI 35.57 kg/m2     The patient is a well-developed, well-nourished male   in no acute distress. The patient is alert and oriented times three. The patient is alert and oriented times three. Mood and affect are normal.  LYMPHATIC: lymph nodes are not enlarged and are within normal limits  SKIN: normal in color and non tender to palpation. There are no bruises or abrasions noted. NEUROLOGICAL: Motor sensory exam is within normal limits. Reflexes are equal bilaterally.  There is normal sensation to pinprick and light touch  MUSCULOSKELETAL:  Examination Left shoulder Right shoulder   Skin Intact Intact   AC joint tenderness - -   Biceps tenderness - -   Forward flexion/Elevation  180   Active abduction  160   Glenohumeral abduction 90 90   External rotation ROM 90 90   Internal rotation ROM 70 70   Apprehension - -   Cruzs Relocation - -   Jerk - -   Load and Shift - -   Obriens - -   Speeds - -   Impingement sign + +   Supraspinatus/Empty Can -, 5/5 -, 5/5   External Rotation Strength -, 5/5 -, 5/5   Lift Off/Belly Press -, 5/5 -, 5/5   Neurovascular Intact Intact      PROCEDURE: After sterile prep, 6 cc of Xylocaine and 1 cc of Kenalog were injected into the bilateral  shoulders. [facility]  OFFICE PROCEDURE PROGRESS NOTE        Chart reviewed for the following:  Arneta Apgar, MD, have reviewed the History, Physical and updated the Allergic reactions for [address] [place] [address] performed immediately prior to start of procedure:  Arneta Apgar, MD, have performed the following reviews on [place] prior to the start of the procedure:            * Patient was identified by name and date of birth   * Agreement on procedure being performed was verified  * Risks and Benefits explained to the patient  * Procedure site verified and marked as necessary  * Patient was positioned for comfort  * Consent was signed and verified     Time: 10:00 AM    Date of procedure: 2017    Procedure performed by:  Priyanka Calderon MD    Provider assisted by: (see medication administration)    How tolerated by patient: tolerated the procedure well with no complications    Comments: none      IMAGING:   XR of the B/L shoulders dated  was reviewed and read: sclerotic changes of the left greater tuberosity, sclerotic changes of the right as well, not as bad as the left      IMPRESSION:      ICD-10-CM ICD-9-CM    1.  Primary osteoarthritis of both shoulders M19.011 715.11     M19.012     2. Acute pain of both shoulders M25.511 719.41 AMB POC XRAY, SHOULDER; COMPLETE, 2+    M25.512  AMB POC XRAY, SHOULDER; COMPLETE, 2+      TRIAMCINOLONE ACETONIDE INJ      triamcinolone acetonide (KENALOG) 40 mg/mL injection      DRAIN/INJECT LARGE JOINT/BURSA      meloxicam (MOBIC) 15 mg tablet   3. Shoulder impingement syndrome, left M75.42 726.2    4. Impingement syndrome, shoulder, right M75.41 726.2         PLAN:  1. Patient experiencing worsening B/L shoulder pain. Be mindful of pain while working out, avoid overhead presses. Risk factors include: htn  2. Yes cortisone injection indicated today : B/L SHOULDERS  3. No Physical/Occupational Therapy indicated today  4. No diagnostic test indicated today  5. No durable medical equipment indicated today  6. No referral indicated today   7. Yes medications indicated today: MOBIC 15 MG  8. No Narcotic indicated today    RTC 4 weeks  Follow-up Disposition: Not on File    Scribed by Loree Izquierdo[address]) as dictated by MD MELISSA Bustillos, Dr. Jorge Pickering, confirm that all documentation is accurate.     Jorge Pickering M.D.   [facility]

## 2021-05-23 ENCOUNTER — APPOINTMENT (OUTPATIENT)
Dept: CT IMAGING | Age: 56
End: 2021-05-23
Attending: EMERGENCY MEDICINE
Payer: COMMERCIAL

## 2021-05-23 ENCOUNTER — HOSPITAL ENCOUNTER (EMERGENCY)
Age: 56
Discharge: HOME | End: 2021-05-23
Attending: EMERGENCY MEDICINE
Payer: COMMERCIAL

## 2021-05-23 VITALS
WEIGHT: 315 LBS | HEART RATE: 71 BPM | DIASTOLIC BLOOD PRESSURE: 102 MMHG | TEMPERATURE: 98.5 F | SYSTOLIC BLOOD PRESSURE: 163 MMHG | HEIGHT: 77 IN | RESPIRATION RATE: 14 BRPM | BODY MASS INDEX: 37.19 KG/M2 | OXYGEN SATURATION: 100 %

## 2021-05-23 DIAGNOSIS — R22.41 MASS OF RIGHT THIGH: Primary | ICD-10-CM

## 2021-05-23 LAB
ALBUMIN SERPL-MCNC: 3.9 G/DL (ref 3.4–5)
ALBUMIN/GLOB SERPL: 0.9 {RATIO} (ref 0.8–1.7)
ALP SERPL-CCNC: 59 U/L (ref 45–117)
ALT SERPL-CCNC: 34 U/L (ref 16–61)
ANION GAP SERPL CALC-SCNC: 5 MMOL/L (ref 3–18)
AST SERPL-CCNC: 24 U/L (ref 10–38)
BASOPHILS # BLD: 0.1 K/UL (ref 0–0.1)
BASOPHILS NFR BLD: 1 % (ref 0–2)
BILIRUB SERPL-MCNC: 0.5 MG/DL (ref 0.2–1)
BUN SERPL-MCNC: 12 MG/DL (ref 7–18)
BUN/CREAT SERPL: 11 (ref 12–20)
CALCIUM SERPL-MCNC: 8.7 MG/DL (ref 8.5–10.1)
CHLORIDE SERPL-SCNC: 105 MMOL/L (ref 100–111)
CO2 SERPL-SCNC: 32 MMOL/L (ref 21–32)
CREAT SERPL-MCNC: 1.07 MG/DL (ref 0.6–1.3)
DIFFERENTIAL METHOD BLD: ABNORMAL
EOSINOPHIL # BLD: 0.1 K/UL (ref 0–0.4)
EOSINOPHIL NFR BLD: 2 % (ref 0–5)
ERYTHROCYTE [DISTWIDTH] IN BLOOD BY AUTOMATED COUNT: 13.2 % (ref 11.6–14.5)
GLOBULIN SER CALC-MCNC: 4.2 G/DL (ref 2–4)
GLUCOSE SERPL-MCNC: 94 MG/DL (ref 74–99)
HCT VFR BLD AUTO: 41.5 % (ref 36–48)
HGB BLD-MCNC: 13.7 G/DL (ref 13–16)
LYMPHOCYTES # BLD: 1.7 K/UL (ref 0.9–3.6)
LYMPHOCYTES NFR BLD: 39 % (ref 21–52)
MAGNESIUM SERPL-MCNC: 2.1 MG/DL (ref 1.6–2.6)
MCH RBC QN AUTO: 28 PG (ref 24–34)
MCHC RBC AUTO-ENTMCNC: 33 G/DL (ref 31–37)
MCV RBC AUTO: 84.7 FL (ref 74–97)
MONOCYTES # BLD: 0.4 K/UL (ref 0.05–1.2)
MONOCYTES NFR BLD: 9 % (ref 3–10)
NEUTS SEG # BLD: 2 K/UL (ref 1.8–8)
NEUTS SEG NFR BLD: 47 % (ref 40–73)
PLATELET # BLD AUTO: 193 K/UL (ref 135–420)
PMV BLD AUTO: 10.8 FL (ref 9.2–11.8)
POTASSIUM SERPL-SCNC: 2.9 MMOL/L (ref 3.5–5.5)
PROT SERPL-MCNC: 8.1 G/DL (ref 6.4–8.2)
RBC # BLD AUTO: 4.9 M/UL (ref 4.35–5.65)
SODIUM SERPL-SCNC: 142 MMOL/L (ref 136–145)
WBC # BLD AUTO: 4.3 K/UL (ref 4.6–13.2)

## 2021-05-23 PROCEDURE — 74011250637 HC RX REV CODE- 250/637: Performed by: EMERGENCY MEDICINE

## 2021-05-23 PROCEDURE — 85025 COMPLETE CBC W/AUTO DIFF WBC: CPT

## 2021-05-23 PROCEDURE — 80053 COMPREHEN METABOLIC PANEL: CPT

## 2021-05-23 PROCEDURE — 99283 EMERGENCY DEPT VISIT LOW MDM: CPT

## 2021-05-23 PROCEDURE — 73701 CT LOWER EXTREMITY W/DYE: CPT

## 2021-05-23 PROCEDURE — 74011000636 HC RX REV CODE- 636: Performed by: EMERGENCY MEDICINE

## 2021-05-23 PROCEDURE — 83735 ASSAY OF MAGNESIUM: CPT

## 2021-05-23 RX ORDER — POTASSIUM CHLORIDE 20 MEQ/1
40 TABLET, EXTENDED RELEASE ORAL
Status: COMPLETED | OUTPATIENT
Start: 2021-05-23 | End: 2021-05-23

## 2021-05-23 RX ADMIN — POTASSIUM CHLORIDE 40 MEQ: 1500 TABLET, EXTENDED RELEASE ORAL at 11:37

## 2021-05-23 RX ADMIN — IOPAMIDOL 100 ML: 612 INJECTION, SOLUTION INTRAVENOUS at 12:15

## 2021-05-23 NOTE — ED TRIAGE NOTES
The patient presents for evaluation of right inguinal hernia. States, \"the right side has been swollen for the past few days. \"

## 2021-05-23 NOTE — ED PROVIDER NOTES
EMERGENCY DEPARTMENT HISTORY AND PHYSICAL EXAM  This was created with voice recognition software and transcription errors may be present. 9:49 AM  Date: 5/23/2021  Patient Name: Elisa Hodges    History of Presenting Illness     Chief Complaint:    History Provided By:     HPI: Elisa Hodges is a 49 y.o. male has medical history of HOMER chest pain hypertension hypokalemia obesity renal cell carcinoma who presents with by swelling and pain for the past 2 weeks to a month. Patient states he has not really noticed it before then but it has been causing worsening pain and is having to sleep with a pillow between his legs. The pain is to the right thigh immediately. No rash no fevers no chills no injury. PCP: Cheng Lombardi MD      Past History     Past Medical History:  Past Medical History:   Diagnosis Date    [facility]     Atypical chest pain     Hypertension     Hypokalemia     Obesity (BMI 30.0-34. 9) 4/24/2017    Renal cell carcinoma of right kidney ([facility] 12/23/14    Renal mass, right        Past Surgical History:  Past Surgical History:   Procedure Laterality Date    HX NEPHRECTOMY Right 12/23/14    Open Partial, [facility]. Bi Faria, [facility]    HX WISDOM TEETH EXTRACTION         Family History:  Family History   Problem Relation Age of Onset    Cancer Maternal Grandfather         Prostate    Diabetes Maternal Grandmother     Hypertension Mother        Social History:  Social History     Tobacco Use    Smoking status: Former Smoker     Packs/day: 0.05     Years: 1.00     Pack years: 0.05     Types: Cigarettes    Smokeless tobacco: Never Used   Substance Use Topics    Alcohol use: Yes     Alcohol/week: 0.0 - 0.8 standard drinks     Comment: occasionally    Drug use: No       Allergies:   Allergies   Allergen Reactions    Gadolinium-Containing Contrast Media Itching and Unknown (comments)     Pt had itching, nausea, perfuse sweating, lightheadedness----taken to ed for evaluation       Review of Systems     Review of Systems   All other systems reviewed and are negative. 10 point review of systems otherwise negative unless noted in HPI. Physical Exam       Physical Exam  Constitutional:       Appearance: Normal appearance. HENT:      Head: Normocephalic and atraumatic. Nose: Nose normal.      Mouth/Throat:      Mouth: Mucous membranes are moist.   Eyes:      Pupils: Pupils are equal, round, and reactive to light. Cardiovascular:      Rate and Rhythm: Normal rate. Pulmonary:      Effort: Pulmonary effort is normal.   Musculoskeletal:         General: Normal range of motion. Cervical back: Normal range of motion. Skin:     General: Skin is warm. Comments: She has about a 10 cm lipoma on the medial aspect of his right thigh which is causing pain when he sleeps. No inguinal hernia laterally. Neurological:      Mental Status: He is alert. Diagnostic Study Results     Vital Signs  EKG:  Labs: CBC unremarkable mild hypokalemia given potassium here  Imaging:     Medical Decision Making     ED Course: Progress Notes, Reevaluation, and Consults:    I will be the provider of record for this patient. Provider Notes (Medical Decision Making): 65-year-old gentleman presents with painful area to his right medial thigh. He has had a small mass there which is soft and does feel like a lipoma. Discussed with radiology will obtain a CT with contrast    1. No suspicious mass is seen in the area of the palpable lump in the medial  upper right thigh            Diagnosis     Clinical Impression: No diagnosis found. Disposition:        Patient's Medications   Start Taking    No medications on file   Continue Taking    AMLODIPINE (NORVASC) 10 MG TABLET    Take  by mouth daily. AMLODIPINE-BENAZEPRIL (LOTREL) 5-10 MG PER CAPSULE    Take 1 Cap by mouth daily. ASPIRIN 81 MG CHEWABLE TABLET    Take 1 Tab by mouth daily.     BENZOCAINE 10 % OINT    1 g by Apply Externally route three (3) times daily as needed. IBUPROFEN (MOTRIN) 600 MG TABLET    Take 1 Tab by mouth every six (6) hours as needed for Pain. MELOXICAM (MOBIC) 15 MG TABLET    Take 1 Tab by mouth daily (with breakfast).    These Medications have changed    No medications on file   Stop Taking    No medications on file

## 2021-08-05 ENCOUNTER — APPOINTMENT (OUTPATIENT)
Dept: PHYSICAL THERAPY | Age: 56
End: 2021-08-05

## 2021-08-30 ENCOUNTER — OFFICE VISIT (OUTPATIENT)
Dept: ORTHOPEDIC SURGERY | Age: 56
End: 2021-08-30
Payer: COMMERCIAL

## 2021-08-30 VITALS
TEMPERATURE: 97.2 F | HEART RATE: 87 BPM | WEIGHT: 315 LBS | OXYGEN SATURATION: 98 % | HEIGHT: 77 IN | BODY MASS INDEX: 37.19 KG/M2 | RESPIRATION RATE: 16 BRPM

## 2021-08-30 DIAGNOSIS — G89.29 CHRONIC PAIN OF RIGHT KNEE: ICD-10-CM

## 2021-08-30 DIAGNOSIS — M17.11 PRIMARY OSTEOARTHRITIS OF RIGHT KNEE: Primary | ICD-10-CM

## 2021-08-30 DIAGNOSIS — M25.561 CHRONIC PAIN OF RIGHT KNEE: ICD-10-CM

## 2021-08-30 PROCEDURE — 99203 OFFICE O/P NEW LOW 30 MIN: CPT | Performed by: SPECIALIST

## 2021-08-30 PROCEDURE — 20610 DRAIN/INJ JOINT/BURSA W/O US: CPT | Performed by: SPECIALIST

## 2021-08-30 RX ORDER — BETAMETHASONE SODIUM PHOSPHATE AND BETAMETHASONE ACETATE 3; 3 MG/ML; MG/ML
3 INJECTION, SUSPENSION INTRA-ARTICULAR; INTRALESIONAL; INTRAMUSCULAR; SOFT TISSUE ONCE
Status: COMPLETED | OUTPATIENT
Start: 2021-08-30 | End: 2021-08-30

## 2021-08-30 RX ADMIN — BETAMETHASONE SODIUM PHOSPHATE AND BETAMETHASONE ACETATE 3 MG: 3; 3 INJECTION, SUSPENSION INTRA-ARTICULAR; INTRALESIONAL; INTRAMUSCULAR; SOFT TISSUE at 12:03

## 2021-08-30 NOTE — PROGRESS NOTES
Patient: Deidra Ram                MRN: [id number]       SSN: [id number]  YOB: 1965        AGE: 49 y.o. SEX: male    PCP: Tanya Aguirre MD  08/30/21    Chief Complaint   Patient presents with    Knee Pain     right knee pain     HISTORY:  Deidra Ram is a 49 y.o. male who is seen for increased right knee pain. He was last seen by [facility] in 4/27/17. He does not recall any injury. He has been experiencing right knee pain for the past several years. He feels pain with standing, walking and stair climbing. He experiences startup pain after sitting. He feels knee pain and stiffness at night and when holding his knee in flexion for long periods of time. He reports knee pain while using an incline treadmill at the gym. He takes ibuprofen and Aleve for knee pain with benefit. Pain Assessment  8/30/2021   Location of Pain Knee   Location Modifiers Right   Severity of Pain 6   Quality of Pain Sharp   Quality of Pain Comment stabbing pain. just pain. swelling   Duration of Pain -   Frequency of Pain -   Date Pain First Started (No Data)   Date Pain First Started Comment 3 to 4 weeks ago   Aggravating Factors Walking;Stairs;Standing;Squatting;Bending   Aggravating Factors Comment driving for long period of time. night pain   Limiting Behavior -   Relieving Factors Nothing   Result of Injury No     Occupation, etc:  Mr. Freddie Escobar works as a  for [employer] for [employer] and has an  background. He lives with his wife, 68-year-old son, and 68-year-old daughter in the [place] [address]. His son works at [employer] and his daughter is studying sociology at [employer] He has high blood pressure. He is not diabetic. He reports that he has gained 40 pounds over the past 2 years. Current weight is 340 pounds. He is 6'5\" tall. His PCP is Dr. Yamileth Virk. He reports a family h/o arthritis.      Weight Metrics 8/30/2021 5/23/2021 11/9/2017 4/24/2017 1/29/2017 1/12/2017 1/28/2015   Weight 340 lb 12.8 oz 345 lb 300 lb 300 lb 295 lb 295 lb 320 lb   BMI 40.41 kg/m2 40.91 kg/m2 35.57 kg/m2 35.57 kg/m2 34.98 kg/m2 34.98 kg/m2 37.94 kg/m2     Patient Active Problem List   Diagnosis Code    Obstructive sleep apnea (adult) (pediatric) G47.33    Obstructive sleep apnea (adult) (pediatric) G47.33    Elevated troponin R77.8    Allergic reaction T78.40XA    Vasovagal episode R55    Troponin level elevated R77.8    Kidney mass N28.89    Hypotension I95.9    Renal cancer (HCC) C64.9    Obesity (BMI 30.0-34. 9) E66.9     REVIEW OF SYSTEMS: All Below are Negative except: See HPI   Constitutional: negative for fever, chills, and weight loss. Cardiovascular: negative for chest pain, claudication, leg swelling, SOB, TURK   Gastrointestinal: Negative for pain, N/V/C/D, Blood in stool or urine, dysuria,  hematuria, incontinence, pelvic pain. Musculoskeletal: See HPI   Neurological: Negative for dizziness and weakness. Negative for headaches, Visual changes, confusion, seizures   Phychiatric/Behavioral: Negative for depression, memory loss, substance  abuse. Extremities: Negative for hair changes, rash, or skin lesion changes. Hematologic: Negative for bleeding problems, bruising, pallor or swollen lymph  nodes   Peripheral Vascular: No calf pain, no circulation deficits. Social History     Socioeconomic History    Marital status:      Spouse name: Not on file    Number of children: Not on file    Years of education: Not on file    Highest education level: Not on file   Occupational History    Not on file   Tobacco Use    Smoking status: Former Smoker     Packs/day: 0.05     Years: 1.00     Pack years: 0.05     Types: Cigarettes    Smokeless tobacco: Never Used   Substance and Sexual Activity    Alcohol use:  Yes     Alcohol/week: 0.0 - 0.8 standard drinks     Comment: occasionally    Drug use: No    Sexual activity: Yes     Partners: Female   Other Topics Concern    Not on file   Social History Narrative    Not on file     Social Determinants of Health     Financial Resource Strain:     Difficulty of Paying Living Expenses:    Food Insecurity:     Worried About Running Out of Food in the Last Year:     [address] in the Last Year:    Transportation Needs:     Lack of Transportation (Medical):  Lack of Transportation (Non-Medical):    Physical Activity:     Days of Exercise per Week:     Minutes of Exercise per Session:    Stress:     Feeling of Stress :    Social Connections:     Frequency of Communication with Friends and Family:     Frequency of Social Gatherings with Friends and Family:     Attends Jewish Services:     Active Member of Clubs or Organizations:     Attends Club or Organization Meetings:     Marital Status:    Intimate Partner Violence:     Fear of Current or Ex-Partner:     Emotionally Abused:     Physically Abused:     Sexually Abused: Allergies   Allergen Reactions    Gadolinium-Containing Contrast Media Itching and Unknown (comments)     Pt had itching, nausea, perfuse sweating, lightheadedness----taken to ed for evaluation      Current Outpatient Medications   Medication Sig    amLODIPine-benazepril (LOTREL) 5-10 mg per capsule Take 1 Cap by mouth daily.  ibuprofen (MOTRIN) 600 mg tablet Take 1 Tab by mouth every six (6) hours as needed for Pain.  aspirin 81 mg chewable tablet Take 1 Tab by mouth daily.  amLODIPine (NORVASC) 10 mg tablet Take  by mouth daily.  meloxicam (MOBIC) 15 mg tablet Take 1 Tab by mouth daily (with breakfast). (Patient not taking: Reported on 8/30/2021)    benzocaine 10 % oint 1 g by Apply Externally route three (3) times daily as needed. (Patient not taking: Reported on 11/9/2017)     No current facility-administered medications for this visit.       PHYSICAL EXAMINATION:  Visit Vitals  Pulse 87   Temp 97.2 °F (36.2 °C) (Temporal)   Resp 16   Ht 6' 5\" (1.956 m)   Wt 340 lb 12.8 oz (154.6 kg)   SpO2 98%   BMI 40.41 kg/m²      ORTHO EXAMINATION:  Examination Right knee Left knee   Skin Intact Intact   Range of motion 115-0 120-0   Effusion - -   Medial joint line tenderness + +   Lateral joint line tenderness - -   Popliteal tenderness - -   Osteophytes palpable +, moderate medial -   Brians - -   Patella crepitus + +   Anterior drawer - -   Lateral laxity - -   Medial laxity - -   Varus deformity - -   Valgus deformity - -   Pretibial edema + -   Calf tenderness - -     TIME OUT:  Chart reviewed for the following:   I, Ning Otero MD, have reviewed the History, Physical and updated the Allergic reactions for [address] performed immediately prior to start of procedure:  Christy Lobato MD, have performed the following reviews on Ranjana Basilio prior to the start of the procedure:          * Patient was identified by name and date of birth   * Agreement on procedure being performed was verified  * Risks and Benefits explained to the patient  * Procedure site verified and marked as necessary  * Patient was positioned for comfort  * Consent was obtained     Time: 11:56 AM     Date of procedure: 8/30/2021  Procedure performed by:  Ning Otero MD  Mr. Ryanne Leach tolerated the procedure well with no complications. RADIOGRAPHS:  XR RIGHT KNEE 1/12/17  IMPRESSION:  No fracture or dislocation. Small suprapatellar joint effusion. IMPRESSION:      ICD-10-CM ICD-9-CM    1. Primary osteoarthritis of right knee  M17.11 715.16 betamethasone (CELESTONE) injection 3 mg      DRAIN/INJECT LARGE JOINT/BURSA      PROCEDURE AUTHORIZATION TO    2. Chronic pain of right knee  M25.561 719.46 betamethasone (CELESTONE) injection 3 mg    G89.29 338.29 DRAIN/INJECT LARGE JOINT/BURSA      PROCEDURE AUTHORIZATION TO      PLAN: Dietary counseling provided today.  Start weight loss with low carb diet and intermittent fasting. He will be referred for bariatric surgery consultation. Consider visco supplementation if pain continues. After discussing treatment options, patient's right knee was injected with 4 cc Marcaine and 1/2 cc Celestone. There is no need for surgery at this time. He will follow up as needed.      Scribed by Laurie Pedro ([address]) as dictated by Misha Hawkins MD

## 2021-09-27 ENCOUNTER — OFFICE VISIT (OUTPATIENT)
Dept: ORTHOPEDIC SURGERY | Age: 56
End: 2021-09-27
Payer: COMMERCIAL

## 2021-09-27 VITALS
HEIGHT: 77 IN | TEMPERATURE: 97.1 F | WEIGHT: 315 LBS | OXYGEN SATURATION: 98 % | HEART RATE: 81 BPM | RESPIRATION RATE: 16 BRPM | BODY MASS INDEX: 37.19 KG/M2

## 2021-09-27 DIAGNOSIS — M25.561 CHRONIC PAIN OF RIGHT KNEE: ICD-10-CM

## 2021-09-27 DIAGNOSIS — G89.29 CHRONIC PAIN OF RIGHT KNEE: ICD-10-CM

## 2021-09-27 DIAGNOSIS — M17.11 PRIMARY OSTEOARTHRITIS OF RIGHT KNEE: Primary | ICD-10-CM

## 2021-09-27 PROCEDURE — 20610 DRAIN/INJ JOINT/BURSA W/O US: CPT | Performed by: SPECIALIST

## 2021-09-27 PROCEDURE — 99213 OFFICE O/P EST LOW 20 MIN: CPT | Performed by: SPECIALIST

## 2021-09-27 RX ORDER — BETAMETHASONE SODIUM PHOSPHATE AND BETAMETHASONE ACETATE 3; 3 MG/ML; MG/ML
3 INJECTION, SUSPENSION INTRA-ARTICULAR; INTRALESIONAL; INTRAMUSCULAR; SOFT TISSUE ONCE
Status: COMPLETED | OUTPATIENT
Start: 2021-09-27 | End: 2021-09-27

## 2021-09-27 RX ADMIN — BETAMETHASONE SODIUM PHOSPHATE AND BETAMETHASONE ACETATE 3 MG: 3; 3 INJECTION, SUSPENSION INTRA-ARTICULAR; INTRALESIONAL; INTRAMUSCULAR; SOFT TISSUE at 11:14

## 2021-09-27 NOTE — PATIENT INSTRUCTIONS
Knee Arthritis: Exercises  Introduction  Here are some examples of exercises for you to try. The exercises may be suggested for a condition or for rehabilitation. Start each exercise slowly. Ease off the exercises if you start to have pain. You will be told when to start these exercises and which ones will work best for you. How to do the exercises  Knee flexion with heel slide    1. Lie on your back with your knees bent. 2. Slide your heel back by bending your affected knee as far as you can. Then hook your other foot around your ankle to help pull your heel even farther back. 3. Hold for about 6 seconds, then rest for up to 10 seconds. 4. Repeat 8 to 12 times. 5. Switch legs and repeat steps 1 through 4, even if only one knee is sore. Quad sets    1. Sit with your affected leg straight and supported on the floor or a firm bed. Place a small, rolled-up towel under your knee. Your other leg should be bent, with that foot flat on the floor. 2. Tighten the thigh muscles of your affected leg by pressing the back of your knee down into the towel. 3. Hold for about 6 seconds, then rest for up to 10 seconds. 4. Repeat 8 to 12 times. 5. Switch legs and repeat steps 1 through 4, even if only one knee is sore. Straight-leg raises to the front    1. Lie on your back with your good knee bent so that your foot rests flat on the floor. Your affected leg should be straight. Make sure that your low back has a normal curve. You should be able to slip your hand in between the floor and the small of your back, with your palm touching the floor and your back touching the back of your hand. 2. Tighten the thigh muscles in your affected leg by pressing the back of your knee flat down to the floor. Hold your knee straight. 3. Keeping the thigh muscles tight and your leg straight, lift your affected leg up so that your heel is about 12 inches off the floor. Hold for about 6 seconds, then lower slowly.   4. Relax for up to 10 seconds between repetitions. 5. Repeat 8 to 12 times. 6. Switch legs and repeat steps 1 through 5, even if only one knee is sore. Active knee flexion    1. Lie on your stomach with your knees straight. If your kneecap is uncomfortable, roll up a washcloth and put it under your leg just above your kneecap. 2. Lift the foot of your affected leg by bending the knee so that you bring the foot up toward your buttock. If this motion hurts, try it without bending your knee quite as far. This may help you avoid any painful motion. 3. Slowly move your leg up and down. 4. Repeat 8 to 12 times. 5. Switch legs and repeat steps 1 through 4, even if only one knee is sore. Quadriceps stretch (facedown)    1. Lie flat on your stomach, and rest your face on the floor. 2. Wrap a towel or belt strap around the lower part of your affected leg. Then use the towel or belt strap to slowly pull your heel toward your buttock until you feel a stretch. 3. Hold for about 15 to 30 seconds, then relax your leg against the towel or belt strap. 4. Repeat 2 to 4 times. 5. Switch legs and repeat steps 1 through 4, even if only one knee is sore. Stationary exercise bike    1. If you do not have a stationary exercise bike at home, you can find one to ride at your local health club or community center. 2. Adjust the height of the bike seat so that your knee is slightly bent when your leg is extended downward. If your knee hurts when the pedal reaches the top, you can raise the seat so that your knee does not bend as much. 3. Start slowly. At first, try to do 5 to 10 minutes of cycling with little to no resistance. Then increase your time and the resistance bit by bit until you can do 20 to 30 minutes without pain. 4. If you start to have pain, rest your knee until your pain gets back to the level that is normal for you. Or cycle for less time or with less effort. Follow-up care is a key part of your treatment and safety.  Be sure to make and go to all appointments, and call your doctor if you are having problems. It's also a good idea to know your test results and keep a list of the medicines you take. Where can you learn more? Go to http://www.buckner.com/  Enter C159 in the search box to learn more about \"Knee Arthritis: Exercises. \"  Current as of: July 1, 2021               Content Version: 13.0  © 2006-2021 [employer]. Care instructions adapted under license by [employer] (which disclaims liability or warranty for this information). If you have questions about a medical condition or this instruction, always ask your healthcare professional. [address] any warranty or liability for your use of this information.

## 2021-09-27 NOTE — PROGRESS NOTES
Patient: Fermin Dutta                MRN: [id number]       SSN: [id number]  YOB: 1965        AGE: 49 y.o. SEX: male    PCP: Naty Jameson MD  09/27/21    Chief Complaint   Patient presents with    Knee Pain     right     HISTORY:  Fermin Dutta is a 49 y.o. male who is seen for increased medial right knee pain. He was last seen by [facility] in 4/27/17. He does not recall any injury. He has been experiencing right knee pain for the past several years. He feels pain with standing, walking and stair climbing. He experiences startup pain after sitting. He feels knee pain and stiffness at night and when holding his knee in flexion for long periods of time. He reports knee pain while using an incline treadmill at the gym. He takes ibuprofen and Aleve for knee pain with benefit. He has tried a variety of conservative measures including cortisone injections, activity modification, bracing, NSAIDs and physical therapy. Pain Assessment  9/27/2021   Location of Pain Knee   Location Modifiers Right   Severity of Pain 5   Quality of Pain Aching; Sharp   Quality of Pain Comment -   Duration of Pain Persistent   Frequency of Pain -   Date Pain First Started -   Date Pain First Started Comment -   Aggravating Factors Walking;Standing; Other (Comment)   Aggravating Factors Comment night time is worse   Limiting Behavior No   Relieving Factors Nothing   Result of Injury -     Occupation, etc:  Mr. Severo Rhymes works as a  for [employer] for [employer] and has an  background. He lives with his wife, 25-year-old son, and 60-year-old daughter in the [place] [address]. His son works at [employer] and his daughter is studying sociology at [employer]. He has high blood pressure. He is not diabetic. He reports that he has gained 40 pounds over the past 2 years. Current weight is 340 pounds. He is 6'5\" tall. His PCP is Dr. Jacqueline Olivo. He reports a family h/o arthritis. Weight Metrics 9/27/2021 8/30/2021 5/23/2021 11/9/2017 4/24/2017 1/29/2017 1/12/2017   Weight 340 lb 340 lb 12.8 oz 345 lb 300 lb 300 lb 295 lb 295 lb   BMI 40.32 kg/m2 40.41 kg/m2 40.91 kg/m2 35.57 kg/m2 35.57 kg/m2 34.98 kg/m2 34.98 kg/m2     Patient Active Problem List   Diagnosis Code    Obstructive sleep apnea (adult) (pediatric) G47.33    Obstructive sleep apnea (adult) (pediatric) G47.33    Elevated troponin R77.8    Allergic reaction T78.40XA    Vasovagal episode R55    Troponin level elevated R77.8    Kidney mass N28.89    Hypotension I95.9    Renal cancer (HCC) C64.9    Obesity (BMI 30.0-34. 9) E66.9     REVIEW OF SYSTEMS: All Below are Negative except: See HPI   Constitutional: negative for fever, chills, and weight loss. Cardiovascular: negative for chest pain, claudication, leg swelling, SOB, TURK   Gastrointestinal: Negative for pain, N/V/C/D, Blood in stool or urine, dysuria,  hematuria, incontinence, pelvic pain. Musculoskeletal: See HPI   Neurological: Negative for dizziness and weakness. Negative for headaches, Visual changes, confusion, seizures   Phychiatric/Behavioral: Negative for depression, memory loss, substance  abuse. Extremities: Negative for hair changes, rash, or skin lesion changes. Hematologic: Negative for bleeding problems, bruising, pallor or swollen lymph  nodes   Peripheral Vascular: No calf pain, no circulation deficits. Social History     Socioeconomic History    Marital status:      Spouse name: Not on file    Number of children: Not on file    Years of education: Not on file    Highest education level: Not on file   Occupational History    Not on file   Tobacco Use    Smoking status: Former Smoker     Packs/day: 0.05     Years: 1.00     Pack years: 0.05     Types: Cigarettes    Smokeless tobacco: Never Used   Substance and Sexual Activity    Alcohol use:  Yes     Alcohol/week: 0.0 - 0.8 standard drinks Comment: occasionally    Drug use: No    Sexual activity: Yes     Partners: Female   Other Topics Concern    Not on file   Social History Narrative    Not on file     Social Determinants of Health     Financial Resource Strain:     Difficulty of Paying Living Expenses:    Food Insecurity:     Worried About Running Out of Food in the Last Year:     [address] in the Last Year:    Transportation Needs:     Lack of Transportation (Medical):  Lack of Transportation (Non-Medical):    Physical Activity:     Days of Exercise per Week:     Minutes of Exercise per Session:    Stress:     Feeling of Stress :    Social Connections:     Frequency of Communication with Friends and Family:     Frequency of Social Gatherings with Friends and Family:     Attends Spiritism Services:     Active Member of Clubs or Organizations:     Attends Club or Organization Meetings:     Marital Status:    Intimate Partner Violence:     Fear of Current or Ex-Partner:     Emotionally Abused:     Physically Abused:     Sexually Abused: Allergies   Allergen Reactions    Gadolinium-Containing Contrast Media Itching and Unknown (comments)     Pt had itching, nausea, perfuse sweating, lightheadedness----taken to ed for evaluation      Current Outpatient Medications   Medication Sig    amLODIPine-benazepril (LOTREL) 5-10 mg per capsule Take 1 Cap by mouth daily.  ibuprofen (MOTRIN) 600 mg tablet Take 1 Tab by mouth every six (6) hours as needed for Pain.  aspirin 81 mg chewable tablet Take 1 Tab by mouth daily.  amLODIPine (NORVASC) 10 mg tablet Take  by mouth daily.  meloxicam (MOBIC) 15 mg tablet Take 1 Tab by mouth daily (with breakfast). (Patient not taking: Reported on 8/30/2021)    benzocaine 10 % oint 1 g by Apply Externally route three (3) times daily as needed. (Patient not taking: Reported on 11/9/2017)     No current facility-administered medications for this visit.       PHYSICAL EXAMINATION:  Visit Vitals  Pulse 81   Temp 97.1 °F (36.2 °C)   Resp 16   Ht 6' 5\" (1.956 m)   Wt 340 lb (154.2 kg)   SpO2 98%   BMI 40.32 kg/m²      ORTHO EXAMINATION:  Examination Right knee Left knee   Skin Intact Intact   Range of motion 115-0 120-0   Effusion - -   Medial joint line tenderness + +   Lateral joint line tenderness - -   Popliteal tenderness - -   Osteophytes palpable +, moderate medial -   Brians - -   Patella crepitus + +   Anterior drawer - -   Lateral laxity - -   Medial laxity - -   Varus deformity - -   Valgus deformity - -   Pretibial edema + -   Calf tenderness - -     TIME OUT:  Chart reviewed for the following:   I, Renetta Covarrubias MD, have reviewed the History, Physical and updated the Allergic reactions for [address] performed immediately prior to start of procedure:  Lilliana Piña MD, have performed the following reviews on Brigida Steinberg prior to the start of the procedure:          * Patient was identified by name and date of birth   * Agreement on procedure being performed was verified  * Risks and Benefits explained to the patient  * Procedure site verified and marked as necessary  * Patient was positioned for comfort  * Consent was obtained     Time: 11:07 AM     Date of procedure: 9/27/2021  Procedure performed by:  Renetta Covarrubias MD  Mr. Brando Vásquez tolerated the procedure well with no complications. RADIOGRAPHS:  XR RIGHT KNEE 1/12/17  IMPRESSION:  No fracture or dislocation. Small suprapatellar joint effusion. IMPRESSION:      ICD-10-CM ICD-9-CM    1. Primary osteoarthritis of right knee  M17.11 715.16 PROCEDURE AUTHORIZATION TO       betamethasone (CELESTONE) injection 3 mg      DRAIN/INJECT LARGE JOINT/BURSA   2. Chronic pain of right knee  M25.561 719.46 PROCEDURE AUTHORIZATION TO     G89.29 338.29 betamethasone (CELESTONE) injection 3 mg      DRAIN/INJECT LARGE JOINT/BURSA     PLAN: At home exercises provided. Consider visco supplementation if pain continues. After discussing treatment options, patient's right knee was injected with 4 cc Marcaine and 1/2 cc Celestone. There is no need for surgery at this time. He will follow up as needed.      Scribed by Nicole Cruz ([address]) as dictated by Lyndsay Corrales MD

## 2021-10-06 ENCOUNTER — DOCUMENTATION ONLY (OUTPATIENT)
Dept: ORTHOPEDIC SURGERY | Age: 56
End: 2021-10-06

## 2021-12-20 ENCOUNTER — HOSPITAL ENCOUNTER (OUTPATIENT)
Dept: LAB | Age: 56
Discharge: HOME | End: 2021-12-20

## 2021-12-20 ENCOUNTER — OFFICE VISIT (OUTPATIENT)
Dept: ORTHOPEDIC SURGERY | Age: 56
End: 2021-12-20
Payer: COMMERCIAL

## 2021-12-20 VITALS
BODY MASS INDEX: 37.19 KG/M2 | WEIGHT: 315 LBS | HEIGHT: 77 IN | HEART RATE: 86 BPM | OXYGEN SATURATION: 96 % | TEMPERATURE: 97.5 F

## 2021-12-20 DIAGNOSIS — M25.562 ACUTE PAIN OF LEFT KNEE: ICD-10-CM

## 2021-12-20 DIAGNOSIS — M70.22 OLECRANON BURSITIS OF LEFT ELBOW: ICD-10-CM

## 2021-12-20 DIAGNOSIS — M25.561 CHRONIC PAIN OF RIGHT KNEE: ICD-10-CM

## 2021-12-20 DIAGNOSIS — G89.29 CHRONIC PAIN OF RIGHT KNEE: ICD-10-CM

## 2021-12-20 DIAGNOSIS — M25.522 LEFT ELBOW PAIN: ICD-10-CM

## 2021-12-20 DIAGNOSIS — M17.11 PRIMARY OSTEOARTHRITIS OF RIGHT KNEE: Primary | ICD-10-CM

## 2021-12-20 DIAGNOSIS — Z87.39 HISTORY OF GOUT: ICD-10-CM

## 2021-12-20 LAB
HBV SURFACE AG SER QL: <0.1 INDEX
HBV SURFACE AG SER QL: NEGATIVE
HIV1 P24 AG SERPL QL IA: NONREACTIVE
HIV1+2 AB SERPL QL IA: NONREACTIVE

## 2021-12-20 PROCEDURE — 99213 OFFICE O/P EST LOW 20 MIN: CPT | Performed by: SPECIALIST

## 2021-12-20 PROCEDURE — 87340 HEPATITIS B SURFACE AG IA: CPT

## 2021-12-20 PROCEDURE — 87389 HIV-1 AG W/HIV-1&-2 AB AG IA: CPT

## 2021-12-20 PROCEDURE — 20605 DRAIN/INJ JOINT/BURSA W/O US: CPT | Performed by: SPECIALIST

## 2021-12-20 PROCEDURE — 20610 DRAIN/INJ JOINT/BURSA W/O US: CPT | Performed by: SPECIALIST

## 2021-12-20 PROCEDURE — 87521 HEPATITIS C PROBE&RVRS TRNSC: CPT

## 2021-12-20 RX ORDER — INDAPAMIDE 2.5 MG/1
TABLET, FILM COATED ORAL
COMMUNITY
Start: 2021-11-26

## 2021-12-20 RX ORDER — BETAMETHASONE SODIUM PHOSPHATE AND BETAMETHASONE ACETATE 3; 3 MG/ML; MG/ML
3 INJECTION, SUSPENSION INTRA-ARTICULAR; INTRALESIONAL; INTRAMUSCULAR; SOFT TISSUE ONCE
Status: COMPLETED | OUTPATIENT
Start: 2021-12-20 | End: 2021-12-20

## 2021-12-20 RX ADMIN — BETAMETHASONE SODIUM PHOSPHATE AND BETAMETHASONE ACETATE 3 MG: 3; 3 INJECTION, SUSPENSION INTRA-ARTICULAR; INTRALESIONAL; INTRAMUSCULAR; SOFT TISSUE at 13:11

## 2021-12-20 NOTE — PROGRESS NOTES
Patient: Rosalina Ramirez                MRN: [id number]       SSN: [id number]  YOB: 1965        AGE: 49 y.o. SEX: male    PCP: Dayana Higuera MD  12/20/21    CC: LEFT ELBOW AND BILATERAL KNEE PAIN, LEFT ELBOW SWELLING    HISTORY:  Rosalina Ramirez is a 49 y.o. male who is seen for left elbow pain. He does not recall any injury. He has been experiencing left elbow pain and swelling for the past few weeks. He feels elbow pain mostly with applied pressure. He has a h/o gout. He is also seen for bilateral right knee pain. His pain was previously only on the right. He felt sudden left knee pain when he stood up while calling [employer] last week. He feels left knee pain with movement. He feels pain with standing, walking and stair climbing. He experiences startup pain after sitting. He feels knee pain and stiffness at night and when holding his knee in flexion for long periods of time. He reports knee pain while using an incline treadmill at the gym. He takes ibuprofen and Aleve for knee pain with benefit. Pain Assessment  12/20/2021   Location of Pain Elbow   Location Modifiers Left   Severity of Pain 0   Quality of Pain -   Quality of Pain Comment -   Duration of Pain -   Frequency of Pain -   Date Pain First Started -   Date Pain First Started Comment -   Aggravating Factors -   Aggravating Factors Comment -   Limiting Behavior -   Relieving Factors -   Result of Injury -     Occupation, etc:  Mr. Chuy Chris works as a  for "[employer] for [employer] and has an  background. He lives with his wife, 24-year-old son, and 22-year-old daughter in the [place] [address]. His son works at [employer] and his daughter is studying sociology at [employer]. He has high blood pressure. He is not diabetic. He reports that he has gained 40 pounds over the past 2 years. Current weight is 345 pounds. He is 6'5\" tall.   His PCP is  Toniaandrew Shameka. He reports a family h/o arthritis. Weight Metrics 12/20/2021 9/27/2021 8/30/2021 5/23/2021 11/9/2017 4/24/2017 1/29/2017   Weight 345 lb 6.4 oz 340 lb 340 lb 12.8 oz 345 lb 300 lb 300 lb 295 lb   BMI 40.96 kg/m2 40.32 kg/m2 40.41 kg/m2 40.91 kg/m2 35.57 kg/m2 35.57 kg/m2 34.98 kg/m2     Patient Active Problem List   Diagnosis Code    Obstructive sleep apnea (adult) (pediatric) G47.33    Obstructive sleep apnea (adult) (pediatric) G47.33    Elevated troponin R77.8    Allergic reaction T78.40XA    Vasovagal episode R55    Troponin level elevated R77.8    Kidney mass N28.89    Hypotension I95.9    Renal cancer (HCC) C64.9    Obesity (BMI 30.0-34. 9) E66.9     REVIEW OF SYSTEMS: All Below are Negative except: See HPI   Constitutional: negative for fever, chills, and weight loss. Cardiovascular: negative for chest pain, claudication, leg swelling, SOB, TURK   Gastrointestinal: Negative for pain, N/V/C/D, Blood in stool or urine, dysuria,  hematuria, incontinence, pelvic pain. Musculoskeletal: See HPI   Neurological: Negative for dizziness and weakness. Negative for headaches, Visual changes, confusion, seizures   Phychiatric/Behavioral: Negative for depression, memory loss, substance  abuse. Extremities: Negative for hair changes, rash, or skin lesion changes. Hematologic: Negative for bleeding problems, bruising, pallor or swollen lymph  nodes   Peripheral Vascular: No calf pain, no circulation deficits. Social History     Socioeconomic History    Marital status:      Spouse name: Not on file    Number of children: Not on file    Years of education: Not on file    Highest education level: Not on file   Occupational History    Not on file   Tobacco Use    Smoking status: Former Smoker     Packs/day: 0.05     Years: 1.00     Pack years: 0.05     Types: Cigarettes    Smokeless tobacco: Never Used   Substance and Sexual Activity    Alcohol use:  Yes     Alcohol/week: 0.0 - 0.8 standard drinks     Comment: occasionally    Drug use: No    Sexual activity: Yes     Partners: Female   Other Topics Concern    Not on file   Social History Narrative    Not on file     Social Determinants of Health     Financial Resource Strain:     Difficulty of Paying Living Expenses: Not on file   Food Insecurity:     Worried About Running Out of Food in the Last Year: Not on file    Nida of Food in the Last Year: Not on file   Transportation Needs:     Lack of Transportation (Medical): Not on file    Lack of Transportation (Non-Medical): Not on file   Physical Activity:     Days of Exercise per Week: Not on file    Minutes of Exercise per Session: Not on file   Stress:     Feeling of Stress : Not on file   Social Connections:     Frequency of Communication with Friends and Family: Not on file    Frequency of Social Gatherings with Friends and Family: Not on file    Attends Shinto Services: Not on file    Active Member of [address] [place] [address] or Organizations: Not on file    Attends Club or Organization Meetings: Not on file    Marital Status: Not on file   Intimate Partner Violence:     Fear of Current or Ex-Partner: Not on file    Emotionally Abused: Not on file    Physically Abused: Not on file    Sexually Abused: Not on file   Housing Stability:     Unable to Pay for Housing in the Last Year: Not on file    Number of [place] in the Last Year: Not on file    Unstable Housing in the Last Year: Not on file      Allergies   Allergen Reactions    Benazepril Anaphylaxis    Lisinopril Anaphylaxis    Gadolinium-Containing Contrast Media Itching and Unknown (comments)     Pt had itching, nausea, perfuse sweating, lightheadedness----taken to ed for evaluation      Current Outpatient Medications   Medication Sig    indapamide (LOZOL) 2.5 mg tablet     aspirin 81 mg chewable tablet Take 1 Tab by mouth daily.  amLODIPine (NORVASC) 10 mg tablet Take  by mouth daily.       amLODIPine-benazepril (LOTREL) 5-10 mg per capsule Take 1 Cap by mouth daily. (Patient not taking: Reported on 12/20/2021)    meloxicam (MOBIC) 15 mg tablet Take 1 Tab by mouth daily (with breakfast). (Patient not taking: Reported on 8/30/2021)    benzocaine 10 % oint 1 g by Apply Externally route three (3) times daily as needed. (Patient not taking: Reported on 11/9/2017)    ibuprofen (MOTRIN) 600 mg tablet Take 1 Tab by mouth every six (6) hours as needed for Pain. No current facility-administered medications for this visit.       PHYSICAL EXAMINATION:  Visit Vitals  Pulse 86   Temp 97.5 °F (36.4 °C) (Temporal)   Ht 6' 5\" (1.956 m)   Wt 345 lb 6.4 oz (156.7 kg)   SpO2 96%   BMI 40.96 kg/m²      ORTHO EXAMINATION:  Examination Right knee Left knee   Skin Intact Intact   Range of motion 115-0 120-0   Effusion - -   Medial joint line tenderness + +   Lateral joint line tenderness - -   Popliteal tenderness - -   Osteophytes palpable +, moderate medial -   Brians - -   Patella crepitus + +   Anterior drawer - -   Lateral laxity - -   Medial laxity - -   Varus deformity - -   Valgus deformity - -   Pretibial edema + -   Calf tenderness - -     Examination Right Elbow Left Elbow   Skin Intact Intact   Range of Motion 135-0 135-0   Tenderness - + olecranon   Swelling - + olecranon   Bruising - -   Stability Normal Normal   Motor Strength  Normal Normal   Neurovascular Intact Intact        TIME OUT:  Chart reviewed for the following:   IYaneth MD, have reviewed the History, Physical and updated the Allergic reactions for [address] performed immediately prior to start of procedure:  Shashi Dominguez MD, have performed the following reviews on Elliott Wallace prior to the start of the procedure:          * Patient was identified by name and date of birth   * Agreement on procedure being performed was verified  * Risks and Benefits explained to the patient  * Procedure site verified and marked as necessary  * Patient was positioned for comfort  * Consent was obtained     Time: 11:42 AM     Date of procedure: 12/20/2021  Procedure performed by:  Margarito Marie MD  Mr. Sean Holman tolerated the procedure well with no complications. RADIOGRAPHS:  XR RIGHT KNEE 1/12/17 [facility]  IMPRESSION:  No fracture or dislocation. Small suprapatellar joint effusion. IMPRESSION:      ICD-10-CM ICD-9-CM    1. Primary osteoarthritis of right knee  M17.11 715.16 sodium hyaluronate (SUPARTZ FX/EUFLEXXA/HYALGAN) 10 mg/mL injection syrg 20 mg      DRAIN/INJECT LARGE JOINT/BURSA   2. Chronic pain of right knee  M25.561 719.46 sodium hyaluronate (SUPARTZ FX/EUFLEXXA/HYALGAN) 10 mg/mL injection syrg 20 mg    G89.29 338.29 DRAIN/INJECT LARGE JOINT/BURSA   3. Acute pain of left knee  M25.562 719.46 CANCELED: AMB POC X-RAY KNEE 3 VIEW   4. Olecranon bursitis of left elbow  M70.22 726.33 betamethasone (CELESTONE) injection 3 mg      DRAIN/INJECT INTERMEDIATE JOINT/BURSA      CRYSTALS, SYNOVIAL FLUID      CULTURE, BODY FLUID W GRAM STAIN   5. Left elbow pain  M25.522 719.42    6. History of gout  Z87.39 V12.29 CRYSTALS, SYNOVIAL FLUID      CULTURE, BODY FLUID W GRAM STAIN     PLAN: After timeout and under sterile conditions, right olecranon aspirated 7 cc of slightly turbid fluid with white flecks consistent with gout. The fluid was sent to the lab for culture and crystal analysis. After discussing treatment options, patient's right knee was injected with 2 cc Euflexxa and his left olecranon was injected with 1/2 cc Celestone and 1/2 cc Marcaine There is no need for surgery. He will follow up in 1 week for Euflexxa #2.       Scribed by Mili Peña (Lonny Worthy) as dictated by Margarito Marie MD

## 2021-12-22 LAB
HIV 1+2 AB+HIV1 P24 AG SERPL QL IA: NONREACTIVE
HIV12 RESULT COMMENT, HHIVC: NORMAL

## 2021-12-27 ENCOUNTER — OFFICE VISIT (OUTPATIENT)
Dept: ORTHOPEDIC SURGERY | Age: 56
End: 2021-12-27
Payer: COMMERCIAL

## 2021-12-27 VITALS — OXYGEN SATURATION: 94 % | TEMPERATURE: 97 F | HEART RATE: 84 BPM | WEIGHT: 315 LBS | BODY MASS INDEX: 41.07 KG/M2

## 2021-12-27 DIAGNOSIS — G89.29 CHRONIC PAIN OF RIGHT KNEE: ICD-10-CM

## 2021-12-27 DIAGNOSIS — M17.11 PRIMARY OSTEOARTHRITIS OF RIGHT KNEE: Primary | ICD-10-CM

## 2021-12-27 DIAGNOSIS — M25.561 CHRONIC PAIN OF RIGHT KNEE: ICD-10-CM

## 2021-12-27 LAB
HCV RNA SERPL NAA+PROBE-LOG IU: NOT DETECTED HCV LOG 10IU/ML
HCV RNA SERPL PROBE AMP-ACNC: NOT DETECTED HCVIU/ML
HCV RNA SERPL QL NAA+PROBE: NOT DETECTED

## 2021-12-27 PROCEDURE — 20610 DRAIN/INJ JOINT/BURSA W/O US: CPT | Performed by: SPECIALIST

## 2021-12-27 NOTE — PROGRESS NOTES
Patient: Kathy Bullock                MRN: [id number]       SSN: [id number]  YOB: 1965        AGE: 49 y.o. SEX: male  Body mass index is 41.07 kg/m². PCP: Valeria Alexander MD  01/03/22    Chief Complaint   Patient presents with    Knee Pain     right      HISTORY:  Kathy Bullock is a 49 y.o. male who is seen for right knee pain. ICD-10-CM ICD-9-CM    1. Primary osteoarthritis of right knee  M17.11 715.16 TN DRAIN/INJECT LARGE JOINT/BURSA      sodium hyaluronate (SUPARTZ FX/EUFLEXXA/HYALGAN) 10 mg/mL injection syrg 20 mg   2. Chronic pain of right knee  M25.561 719.46 TN DRAIN/INJECT LARGE JOINT/BURSA    G89.29 338.29 sodium hyaluronate (SUPARTZ FX/EUFLEXXA/HYALGAN) 10 mg/mL injection syrg 20 mg       Chart reviewed for the following:   Rosie Ibanez MD, have reviewed the History, Physical and updated the Allergic reactions for [address] [place] [address] performed immediately prior to start of procedure:  Rosie Ibanez MD, have performed the following reviews on Kathy Bullock prior to the start of the procedure:            * Patient was identified by name and date of birth   * Agreement on procedure being performed was verified  * Risks and Benefits explained to the patient  * Procedure site verified and marked as necessary  * Patient was positioned for comfort  * Consent was obtained     Time: 10:53 AM    Date of procedure: 1/3/2022    Procedure performed by:  Sae Saini MD    Mr. Leroy Natarajan tolerated the procedure well with no complications. PLAN:  After discussing treatment options, patient's right knee was injected with 2 cc of Euflexxa. Mr. Leroy Natarajan will follow up in one week to complete his visco supplementation injection series.       Scribed by Sae Saini MD [address]) as dictated by Sae Saini MD

## 2022-01-03 ENCOUNTER — OFFICE VISIT (OUTPATIENT)
Dept: ORTHOPEDIC SURGERY | Age: 57
End: 2022-01-03
Payer: COMMERCIAL

## 2022-01-03 VITALS
BODY MASS INDEX: 37.19 KG/M2 | WEIGHT: 315 LBS | TEMPERATURE: 97.1 F | OXYGEN SATURATION: 97 % | HEART RATE: 88 BPM | HEIGHT: 77 IN

## 2022-01-03 DIAGNOSIS — G89.29 CHRONIC PAIN OF RIGHT KNEE: ICD-10-CM

## 2022-01-03 DIAGNOSIS — M17.11 PRIMARY OSTEOARTHRITIS OF RIGHT KNEE: Primary | ICD-10-CM

## 2022-01-03 DIAGNOSIS — M25.561 CHRONIC PAIN OF RIGHT KNEE: ICD-10-CM

## 2022-01-03 PROCEDURE — 20610 DRAIN/INJ JOINT/BURSA W/O US: CPT | Performed by: SPECIALIST

## 2022-01-03 NOTE — PROGRESS NOTES
Patient: Tevin Piña                MRN: [id number]       SSN: [id number]  YOB: 1965        AGE: 49 y.o. SEX: male  Body mass index is 41.03 kg/m². PCP: Chiquita Tomlin MD  01/03/22    Chief Complaint   Patient presents with    Knee Pain     rigth knee 3rd inj     HISTORY:  Tevin Piña is a 49 y.o. male who is seen for right knee pain. TIME OUT performed immediately prior to start of procedure:  Queen Ne MD, have performed the following reviews on Tevin Piña prior to the start of the procedure:            * Patient was identified by name and date of birth   * Agreement on procedure being performed was verified  * Risks and Benefits explained to the patient  * Procedure site verified and marked as necessary  * Patient was positioned for comfort  * Consent was obtained     Time: 11:20 AM     Date of procedure: 1/3/2022    Procedure performed by:  Jd Suarez MD    Mr. Ilia Sanchez tolerated the procedure well with no complications      [id number] ICD-9-CM    1. Primary osteoarthritis of right knee  M17.11 715.16 sodium hyaluronate (SUPARTZ FX/EUFLEXXA/HYALGAN) 10 mg/mL injection syrg 20 mg      DRAIN/INJECT LARGE JOINT/BURSA   2. Chronic pain of right knee  M25.561 719.46 sodium hyaluronate (SUPARTZ FX/EUFLEXXA/HYALGAN) 10 mg/mL injection syrg 20 mg    G89.29 338.29 DRAIN/INJECT LARGE JOINT/BURSA     PLAN:  After discussing treatment options, patient's right knee was injected with 2 cc of Euflexxa. Mr. Ilia Sanchez will follow up PRN now that he has completed his visco supplementation injection series.       Scribed by Jd Suarez MD [facility]) as dictated by Jd Suarez MD

## 2022-02-10 ENCOUNTER — OFFICE VISIT (OUTPATIENT)
Dept: ORTHOPEDIC SURGERY | Age: 57
End: 2022-02-10
Payer: COMMERCIAL

## 2022-02-10 VITALS
HEIGHT: 77 IN | OXYGEN SATURATION: 98 % | TEMPERATURE: 97.3 F | HEART RATE: 78 BPM | BODY MASS INDEX: 37.19 KG/M2 | WEIGHT: 315 LBS

## 2022-02-10 DIAGNOSIS — M16.11 PRIMARY OSTEOARTHRITIS OF RIGHT HIP: ICD-10-CM

## 2022-02-10 DIAGNOSIS — M87.051 AVASCULAR NECROSIS OF BONE OF RIGHT HIP (HCC): ICD-10-CM

## 2022-02-10 DIAGNOSIS — M25.551 HIP PAIN, RIGHT: Primary | ICD-10-CM

## 2022-02-10 PROCEDURE — 99213 OFFICE O/P EST LOW 20 MIN: CPT | Performed by: SPECIALIST

## 2022-02-10 PROCEDURE — 73502 X-RAY EXAM HIP UNI 2-3 VIEWS: CPT | Performed by: SPECIALIST

## 2022-02-10 NOTE — LETTER
NOTIFICATION RETURN TO WORK     2/10/2022 4:10 PM    Mr. Muna Interiano  [address] [facility] [place] [address]      To Whom It May Concern:    Muna Interiano is currently under the care of [address] [place] [address]. He will return to work on: Tuesday 2-15-22. Please excuse him from work until then. If there are questions or concerns please have the patient contact our office.         Sincerely,        Emeka Barnett MD

## 2022-02-10 NOTE — LETTER
NOTIFICATION RETURN TO WORK     2/10/2022 4:05 PM    Mr. Yohan Jorgensen  [address] [facility] [address]      To Whom It May Concern:    Yohan Jorgensen is currently under the care of [address] [place] [address]. He will return to work on: Tuesday 1-15-22. Please excuse him from fork until then. If there are questions or concerns please have the patient contact our office.         Sincerely,        Anderson Can MD

## 2022-02-10 NOTE — PROGRESS NOTES
Patient: Justus Cifuentes                MRN: [id number]       SSN: [id number]  YOB: 1965        AGE: 49 y.o. SEX: male    PCP: Kervin Herbert MD  02/10/22    CC: RIGHT GROIN PAIN    HISTORY:  Justus Cifuentes is a 49 y.o. male who is seen for right groin pain. He has been experiencing hip and groin pain for the past few months. He does not recall any injury. He feels pain in his groin with standing and walking. He hasn't worked for the past 3 days due to his constant groin pain. He has difficulty getting comfortable in bed. He was previously seen for bilateral right knee pain. His pain was previously only on the right. He felt sudden left knee pain when he stood up while calling [employer] last month. He feels left knee pain with movement. He feels pain with standing, walking and stair climbing. He experiences startup pain after sitting. He feels knee pain and stiffness at night and when holding his knee in flexion for long periods of time. He reports knee pain while using an incline treadmill at the gym. He takes ibuprofen and Aleve for knee pain with benefit. He was previously seen for left elbow pain. He does not recall any injury. He has been experiencing left elbow pain and swelling for the past few weeks. He feels elbow pain mostly with applied pressure. He has a h/o gout. Pain Assessment  2/10/2022   Location of Pain Hip   Location Modifiers Right   Severity of Pain 6   Quality of Pain Sharp; Aching   Quality of Pain Comment -   Duration of Pain Persistent   Frequency of Pain Constant   Date Pain First Started -   Date Pain First Started Comment -   Aggravating Factors Other (Comment)   Aggravating Factors Comment daily activity   Limiting Behavior Yes   Relieving Factors Other (Comment); Rest   Relieving Factors Comment pillow between legs   Result of Injury Yes   Work-Related Injury No   Type of Injury Fall     Occupation, etc:  Mr. Peter Crane works as a  for [address] [place] [address],[employer] for [employer] and has an  background. He lives with his wife, 59-year-old son, and 22-year-old daughter in the [place] [address]. His son works at [email or web address] and his daughter is studying sociology at [employer]. He has high blood pressure. He is not diabetic. He reports that he has gained 40 pounds over the past 2 years. Current weight is 346 pounds. He is 6'5\" tall. His PCP is Dr. Henrique Mcdonnell. He reports a family h/o arthritis. Weight Metrics 2/10/2022 1/3/2022 12/27/2021 12/20/2021 9/27/2021 8/30/2021 5/23/2021   Weight 346 lb 12.8 oz 346 lb 346 lb 4.8 oz 345 lb 6.4 oz 340 lb 340 lb 12.8 oz 345 lb   BMI 41.12 kg/m2 41.03 kg/m2 41.07 kg/m2 40.96 kg/m2 40.32 kg/m2 40.41 kg/m2 40.91 kg/m2     Patient Active Problem List   Diagnosis Code    Obstructive sleep apnea (adult) (pediatric) G47.33    Obstructive sleep apnea (adult) (pediatric) G47.33    Elevated troponin R77.8    Allergic reaction T78.40XA    Vasovagal episode R55    Troponin level elevated R77.8    Kidney mass N28.89    Hypotension I95.9    Renal cancer (HCC) C64.9    Obesity (BMI 30.0-34. 9) E66.9     REVIEW OF SYSTEMS: All Below are Negative except: See HPI   Constitutional: negative for fever, chills, and weight loss. Cardiovascular: negative for chest pain, claudication, leg swelling, SOB, TURK   Gastrointestinal: Negative for pain, N/V/C/D, Blood in stool or urine, dysuria,  hematuria, incontinence, pelvic pain. Musculoskeletal: See HPI   Neurological: Negative for dizziness and weakness. Negative for headaches, Visual changes, confusion, seizures   Phychiatric/Behavioral: Negative for depression, memory loss, substance  abuse. Extremities: Negative for hair changes, rash, or skin lesion changes. Hematologic: Negative for bleeding problems, bruising, pallor or swollen lymph  nodes   Peripheral Vascular: No calf pain, no circulation deficits.     Social History Socioeconomic History    Marital status:      Spouse name: Not on file    Number of children: Not on file    Years of education: Not on file    Highest education level: Not on file   Occupational History    Not on file   Tobacco Use    Smoking status: Former Smoker     Packs/day: 0.05     Years: 1.00     Pack years: 0.05     Types: Cigarettes    Smokeless tobacco: Never Used   Substance and Sexual Activity    Alcohol use: Yes     Alcohol/week: 0.0 - 0.8 standard drinks     Comment: occasionally    Drug use: No    Sexual activity: Yes     Partners: Female   Other Topics Concern    Not on file   Social History Narrative    Not on file     Social Determinants of Health     Financial Resource Strain:     Difficulty of Paying Living Expenses: Not on file   Food Insecurity:     Worried About Running Out of Food in the Last Year: Not on file    Nida of Food in the Last Year: Not on file   Transportation Needs:     Lack of Transportation (Medical): Not on file    Lack of Transportation (Non-Medical):  Not on file   Physical Activity: Unknown    Days of Exercise per Week: 0 days    Minutes of Exercise per Session: Not on file   Stress:     Feeling of Stress : Not on file   Social Connections:     Frequency of Communication with Friends and Family: Not on file    Frequency of Social Gatherings with Friends and Family: Not on file    Attends Orthodoxy Services: Not on file    Active Member of Clubs or Organizations: Not on file    Attends Club or Organization Meetings: Not on file    Marital Status: Not on file   Intimate Partner Violence: Not At Risk    Fear of Current or Ex-Partner: No    Emotionally Abused: No    Physically Abused: No    Sexually Abused: No   Housing Stability:     Unable to Pay for Housing in the Last Year: Not on file    Number of [place] in the Last Year: Not on file    Unstable Housing in the Last Year: Not on file      Allergies   Allergen Reactions  Benazepril Anaphylaxis    Lisinopril Anaphylaxis    Gadolinium-Containing Contrast Media Itching and Unknown (comments)     Pt had itching, nausea, perfuse sweating, lightheadedness----taken to ed for evaluation      Current Outpatient Medications   Medication Sig    indapamide (LOZOL) 2.5 mg tablet     amLODIPine-benazepril (LOTREL) 5-10 mg per capsule Take 1 Capsule by mouth daily.  meloxicam (MOBIC) 15 mg tablet Take 1 Tab by mouth daily (with breakfast).  benzocaine 10 % oint 1 g by Apply Externally route three (3) times daily as needed.  ibuprofen (MOTRIN) 600 mg tablet Take 1 Tab by mouth every six (6) hours as needed for Pain.  aspirin 81 mg chewable tablet Take 1 Tab by mouth daily.  amLODIPine (NORVASC) 10 mg tablet Take  by mouth daily. No current facility-administered medications for this visit.       PHYSICAL EXAMINATION:  Visit Vitals  Pulse 78   Temp 97.3 °F (36.3 °C)   Ht 6' 5\" (1.956 m)   Wt 346 lb 12.8 oz (157.3 kg)   SpO2 98%   BMI 41.12 kg/m²      ORTHO EXAMINATION:  Examination Right hip Left hip   Skin Intact Intact   External Rotation ROM 10 20   Internal Rotation ROM 0 10   Trochanteric tenderness - -   Hip flexion contracture - -   Antalgic gait - -   Trendelenberg sign - -   Lumbar tenderness - -   Straight leg raise - -   Calf tenderness - -   Neurovascular Intact Intact      TIME OUT:  Chart reviewed for the following:   ILachelle MD, have reviewed the History, Physical and updated the Allergic reactions for [address] performed immediately prior to start of procedure:  Lincoln Hennessy MD, have performed the following reviews on Elba Figueroa prior to the start of the procedure:          * Patient was identified by name and date of birth   * Agreement on procedure being performed was verified  * Risks and Benefits explained to the patient  * Procedure site verified and marked as necessary  * Patient was positioned for comfort  * Consent was obtained     Time: 4:02 PM     Date of procedure: 2/10/2022  Procedure performed by:  Mimi Hein MD  Mr. Mayito Mejia tolerated the procedure well with no complications. RADIOGRAPHS:  XR RIGHT HIP 2/10/22 [facility]  IMPRESSION:  AP pelvis and two views - No fractures, moderate joint space narrowing, + osteophytes present. Tonnis grade 2, cystic changes consistent with AVN    XR RIGHT KNEE 1/12/17 [facility]  IMPRESSION:  No fracture or dislocation. Small suprapatellar joint effusion. IMPRESSION:      ICD-10-CM ICD-9-CM    1. Hip pain, right  M25.551 719.45 AMB POC X-RAY RADEX HIP UNI WITH PELVIS 2-3 VIEWS   2. Primary osteoarthritis of right hip  M16.11 715.15    3. Avascular necrosis of bone of right hip ([facility] [address]  M87.051 733.42      PLAN: Work note provided -- return to work on 2/15/22. He will be referred for bariatric surgery consultation. After discussing treatment options, patient's right lateral hip was injected with 4 cc Marcaine and 1/2 cc Celestone. We discussed possible need for a right hip arthroplasty at some time in the future if pain continues, pending weight loss. We discussed the possibility of a right hip MRI--r/o AVN. He will follow up as needed.      Scribed by Jaret Foster (Enma Saenz) as dictated by Mimi Hein MD

## 2022-03-17 ENCOUNTER — OFFICE VISIT (OUTPATIENT)
Dept: ORTHOPEDIC SURGERY | Age: 57
End: 2022-03-17
Payer: COMMERCIAL

## 2022-03-17 VITALS
WEIGHT: 315 LBS | HEART RATE: 82 BPM | BODY MASS INDEX: 37.19 KG/M2 | TEMPERATURE: 96.8 F | OXYGEN SATURATION: 95 % | HEIGHT: 77 IN | RESPIRATION RATE: 18 BRPM

## 2022-03-17 DIAGNOSIS — M25.551 CHRONIC PAIN OF RIGHT HIP: ICD-10-CM

## 2022-03-17 DIAGNOSIS — M87.051 AVASCULAR NECROSIS OF BONE OF RIGHT HIP (HCC): Primary | ICD-10-CM

## 2022-03-17 DIAGNOSIS — G89.29 CHRONIC PAIN OF RIGHT HIP: ICD-10-CM

## 2022-03-17 DIAGNOSIS — M16.11 PRIMARY OSTEOARTHRITIS OF RIGHT HIP: ICD-10-CM

## 2022-03-17 PROCEDURE — 20610 DRAIN/INJ JOINT/BURSA W/O US: CPT | Performed by: SPECIALIST

## 2022-03-17 PROCEDURE — 99213 OFFICE O/P EST LOW 20 MIN: CPT | Performed by: SPECIALIST

## 2022-03-17 RX ORDER — BETAMETHASONE SODIUM PHOSPHATE AND BETAMETHASONE ACETATE 3; 3 MG/ML; MG/ML
3 INJECTION, SUSPENSION INTRA-ARTICULAR; INTRALESIONAL; INTRAMUSCULAR; SOFT TISSUE ONCE
Status: COMPLETED | OUTPATIENT
Start: 2022-03-17 | End: 2022-03-17

## 2022-03-17 RX ADMIN — BETAMETHASONE SODIUM PHOSPHATE AND BETAMETHASONE ACETATE 3 MG: 3; 3 INJECTION, SUSPENSION INTRA-ARTICULAR; INTRALESIONAL; INTRAMUSCULAR; SOFT TISSUE at 15:26

## 2022-03-17 NOTE — PROGRESS NOTES
Patient: Ursula Moreno                MRN: [id number]       SSN: [id number]  YOB: 1965        AGE: 49 y.o. SEX: male    PCP: Zeina Geiger MD  03/17/22    CC: BILATERAL HIP AND KNEE PAIN    HISTORY:  Ursula Moreno is a 49 y.o. male who is seen for bilateral hip and groin pain R>L. His pains were previously only on the right. He has been experiencing hip and groin pain for the past several months. He does not recall any injury. He feels pain in his hips with standing and walking. He had to take some time off due to his constant groin pain. He has difficulty getting comfortable in bed. He is also seen for bilateral right knee pain. His pain was previously only on the right. He felt sudden left knee pain when he stood up while calling [employer] a couple of months ago. He feels left knee pain with movement. He feels pain with standing, walking and stair climbing. He experiences startup pain after sitting. He feels knee pain and stiffness at night and when holding his knee in flexion for long periods of time. He reports knee pain while using an incline treadmill at the gym. He takes ibuprofen and Aleve for knee pain with benefit. He was previously seen for left elbow pain. He does not recall any injury. He has been experiencing left elbow pain and swelling for the past few weeks. He feels elbow pain mostly with applied pressure. He has a h/o gout. Pain Assessment  3/17/2022   Location of Pain Knee; Hip   Location Modifiers Right   Severity of Pain 5   Quality of Pain Other (Comment)   Quality of Pain Comment feels like knee give out   Duration of Pain -   Frequency of Pain -   Date Pain First Started -   Date Pain First Started Comment -   Aggravating Factors Other (Comment)   Aggravating Factors Comment getting in and out the car   Limiting Behavior -   Relieving Factors Other (Comment)   Relieving Factors Comment aleve   Result of Injury No   Work-Related Injury -   Type of Injury -     Occupation, etc:  Mr. Jose Booth works as a  for [employer] for [employer] and has an  background. He lives with his wife, 27-year-old son, and 27-year-old daughter in the [place] [address]. His son works at [employer] and his daughter was studying sociology at [employer]. He has high blood pressure. He is not diabetic. He reports that he has gained 40 pounds over the past 2 years. Current weight is 355 pounds. He is 6'5\" tall. His PCP is Dr. Katheran Boeck. He reports a family h/o arthritis. Weight Metrics 3/17/2022 2/10/2022 1/3/2022 12/27/2021 12/20/2021 9/27/2021 8/30/2021   Weight 355 lb 346 lb 12.8 oz 346 lb 346 lb 4.8 oz 345 lb 6.4 oz 340 lb 340 lb 12.8 oz   BMI 42.1 kg/m2 41.12 kg/m2 41.03 kg/m2 41.07 kg/m2 40.96 kg/m2 40.32 kg/m2 40.41 kg/m2     Patient Active Problem List   Diagnosis Code    Obstructive sleep apnea (adult) (pediatric) G47.33    Obstructive sleep apnea (adult) (pediatric) G47.33    Elevated troponin R77.8    Allergic reaction T78.40XA    Vasovagal episode R55    Troponin level elevated R77.8    Kidney mass N28.89    Hypotension I95.9    Renal cancer (HCC) C64.9    Obesity (BMI 30.0-34. 9) E66.9     REVIEW OF SYSTEMS: All Below are Negative except: See HPI   Constitutional: negative for fever, chills, and weight loss. Cardiovascular: negative for chest pain, claudication, leg swelling, SOB, TURK   Gastrointestinal: Negative for pain, N/V/C/D, Blood in stool or urine, dysuria,  hematuria, incontinence, pelvic pain. Musculoskeletal: See HPI   Neurological: Negative for dizziness and weakness. Negative for headaches, Visual changes, confusion, seizures   Phychiatric/Behavioral: Negative for depression, memory loss, substance  abuse. Extremities: Negative for hair changes, rash, or skin lesion changes.    Hematologic: Negative for bleeding problems, bruising, pallor or swollen lymph nodes   Peripheral Vascular: No calf pain, no circulation deficits. Social History     Socioeconomic History    Marital status:      Spouse name: Not on file    Number of children: Not on file    Years of education: Not on file    Highest education level: Not on file   Occupational History    Not on file   Tobacco Use    Smoking status: Former Smoker     Packs/day: 0.05     Years: 1.00     Pack years: 0.05     Types: Cigarettes    Smokeless tobacco: Never Used   Substance and Sexual Activity    Alcohol use: Yes     Alcohol/week: 0.0 - 0.8 standard drinks     Comment: occasionally    Drug use: No    Sexual activity: Yes     Partners: Female   Other Topics Concern    Not on file   Social History Narrative    Not on file     Social Determinants of Health     Financial Resource Strain:     Difficulty of Paying Living Expenses: Not on file   Food Insecurity:     Worried About Running Out of Food in the Last Year: Not on file    Nida of Food in the Last Year: Not on file   Transportation Needs:     Lack of Transportation (Medical): Not on file    Lack of Transportation (Non-Medical):  Not on file   Physical Activity: Unknown    Days of Exercise per Week: 0 days    Minutes of Exercise per Session: Not on file   Stress:     Feeling of Stress : Not on file   Social Connections:     Frequency of Communication with Friends and Family: Not on file    Frequency of Social Gatherings with Friends and Family: Not on file    Attends Anglican Services: Not on file    Active Member of Clubs or Organizations: Not on file    Attends Club or Organization Meetings: Not on file    Marital Status: Not on file   Intimate Partner Violence: Not At Risk    Fear of Current or Ex-Partner: No    Emotionally Abused: No    Physically Abused: No    Sexually Abused: No   Housing Stability:     Unable to Pay for Housing in the Last Year: Not on file    Number of [place] in the Last Year: Not on file  Unstable Housing in the Last Year: Not on file      Allergies   Allergen Reactions    Benazepril Anaphylaxis    Lisinopril Anaphylaxis    Gadolinium-Containing Contrast Media Itching and Unknown (comments)     Pt had itching, nausea, perfuse sweating, lightheadedness----taken to ed for evaluation      Current Outpatient Medications   Medication Sig    indapamide (LOZOL) 2.5 mg tablet     amLODIPine-benazepril (LOTREL) 5-10 mg per capsule Take 1 Capsule by mouth daily.  meloxicam (MOBIC) 15 mg tablet Take 1 Tab by mouth daily (with breakfast).  benzocaine 10 % oint 1 g by Apply Externally route three (3) times daily as needed.  ibuprofen (MOTRIN) 600 mg tablet Take 1 Tab by mouth every six (6) hours as needed for Pain.  aspirin 81 mg chewable tablet Take 1 Tab by mouth daily.  amLODIPine (NORVASC) 10 mg tablet Take  by mouth daily. No current facility-administered medications for this visit.       PHYSICAL EXAMINATION:  Visit Vitals  Pulse 82   Temp 96.8 °F (36 °C) (Temporal)   Resp 18   Ht 6' 5\" (1.956 m)   Wt (!) 355 lb (161 kg)   SpO2 95%   BMI 42.10 kg/m²      ORTHO EXAMINATION:  Examination Right hip Left hip   Skin Intact Intact   External Rotation ROM 10 20   Internal Rotation ROM 0 10   Trochanteric tenderness - -   Hip flexion contracture - -   Antalgic gait - -   Trendelenberg sign - -   Lumbar tenderness - -   Straight leg raise - -   Calf tenderness - -   Neurovascular Intact Intact      TIME OUT:  Chart reviewed for the following:   IGrzegorz MD, have reviewed the History, Physical and updated the Allergic reactions for [address] performed immediately prior to start of procedure:  Rosa Cowden, MD, have performed the following reviews on [place] prior to the start of the procedure:          * Patient was identified by name and date of birth   * Agreement on procedure being performed was verified  * Risks and Benefits explained to the patient  * Procedure site verified and marked as necessary  * Patient was positioned for comfort  * Consent was obtained     Time: 3:15 PM     Date of procedure: 3/17/2022  Procedure performed by:  Anderson Can MD  Mr. Chelsea Whitehead tolerated the procedure well with no complications. RADIOGRAPHS:  XR RIGHT HIP 2/10/22 [facility]  IMPRESSION:  AP pelvis and two views - No fractures, moderate joint space narrowing, + osteophytes present. Tonnis grade 2, cystic changes consistent with AVN    XR RIGHT KNEE 1/12/17 [facility]  IMPRESSION:  No fracture or dislocation. Small suprapatellar joint effusion. IMPRESSION:      ICD-10-CM ICD-9-CM    1. Avascular necrosis of bone of right hip (HCC)  M87.051 733.42    2. Primary osteoarthritis of right hip  M16.11 715.15 betamethasone (CELESTONE) injection 3 mg      DRAIN/INJECT LARGE JOINT/BURSA   3. Chronic pain of right hip  M25.551 719.45 betamethasone (CELESTONE) injection 3 mg    G89.29 338.29 DRAIN/INJECT LARGE JOINT/BURSA     PLAN: He will be referred for bariatric surgery consultation. After discussing treatment options, patient's right lateral hip was injected with 4 cc Marcaine and 1/2 cc Celestone. We discussed possible need for a right hip arthroplasty at some time in the future if pain continues, pending weight loss. We discussed the possibility of a right hip MRI--r/o AVN. He will follow up as needed.      Scribed by Neri Calderón (Kathryn Marti) as dictated by Anderson Can MD

## 2022-03-19 PROBLEM — E66.9 OBESITY (BMI 30.0-34.9): Status: ACTIVE | Noted: 2017-04-24

## 2022-03-19 PROBLEM — E66.811 OBESITY (BMI 30.0-34.9): Status: ACTIVE | Noted: 2017-04-24

## 2022-06-06 ENCOUNTER — OFFICE VISIT (OUTPATIENT)
Dept: ORTHOPEDIC SURGERY | Age: 57
End: 2022-06-06
Payer: COMMERCIAL

## 2022-06-06 VITALS
WEIGHT: 315 LBS | TEMPERATURE: 96.9 F | BODY MASS INDEX: 37.19 KG/M2 | OXYGEN SATURATION: 98 % | HEART RATE: 78 BPM | HEIGHT: 77 IN | RESPIRATION RATE: 16 BRPM

## 2022-06-06 DIAGNOSIS — M87.051 AVASCULAR NECROSIS OF BONE OF RIGHT HIP (HCC): ICD-10-CM

## 2022-06-06 DIAGNOSIS — M87.051 AVASCULAR NECROSIS OF BONE OF RIGHT HIP (HCC): Primary | ICD-10-CM

## 2022-06-06 DIAGNOSIS — M25.551 RIGHT HIP PAIN: ICD-10-CM

## 2022-06-06 PROCEDURE — 73502 X-RAY EXAM HIP UNI 2-3 VIEWS: CPT | Performed by: PHYSICIAN ASSISTANT

## 2022-06-06 PROCEDURE — 99213 OFFICE O/P EST LOW 20 MIN: CPT | Performed by: PHYSICIAN ASSISTANT

## 2022-06-06 RX ORDER — DICLOFENAC SODIUM 75 MG/1
75 TABLET, DELAYED RELEASE ORAL 2 TIMES DAILY
Qty: 60 TABLET | Refills: 1 | Status: SHIPPED | OUTPATIENT
Start: 2022-06-06

## 2022-06-06 NOTE — PROGRESS NOTES
Patient: Destinee Aguilar                MRN: [id number]       SSN: [id number]  YOB: 1965        AGE: 56 y.o. SEX: male    PCP: Darell Massey MD  06/06/22 6/6/2022: Laura Webber returns the office with a complaint of acute on chronic bilateral hip right greater than left pain. He has been seen in the past under Dr. Flaco Laguerre direction for similar. He has x-rays of the hip dating back to February 2022. Regarding his right hip pain it limits his ability to stand for only short periods of time and walk short distances. He rates his pain at rest is a 3-4 on a 10 point scale and with activity easily pain exceeds a 6-7 on a 10 point scale. Aleve over-the-counter has been minimally successful for symptom management. Patient was given a betamethasone injection to his right thigh under Dr. Flaco Laguerre direction when last seen in our office. The patient also recalls Dr. Yoel Nava mentioning the need for an MRI and possible hip replacement. Mr. Michael Beach currently holds a BMI of 41.36.        CC: BILATERAL HIP AND KNEE PAIN    HISTORY:  Destinee Aguilar is a 56 y.o. male who is seen for bilateral hip and groin pain R>L. His pains were previously only on the right. He has been experiencing hip and groin pain for the past several months. He does not recall any injury. He feels pain in his hips with standing and walking. He had to take some time off due to his constant groin pain. He has difficulty getting comfortable in bed. He is also seen for bilateral right knee pain. His pain was previously only on the right. He felt sudden left knee pain when he stood up while calling [id number] a couple of months ago. He feels left knee pain with movement. He feels pain with standing, walking and stair climbing. He experiences startup pain after sitting. He feels knee pain and stiffness at night and when holding his knee in flexion for long periods of time.  He reports knee pain while using an incline treadmill at the gym. He takes ibuprofen and Aleve for knee pain with benefit. He was previously seen for left elbow pain. He does not recall any injury. He has been experiencing left elbow pain and swelling for the past few weeks. He feels elbow pain mostly with applied pressure. He has a h/o gout. Pain Assessment  6/6/2022   Location of Pain Knee; Hip   Location Modifiers Right;Left   Severity of Pain 7   Quality of Pain Throbbing; [place]; Aching   Quality of Pain Comment -   Duration of Pain Persistent   Frequency of Pain Constant   Date Pain First Started -   Date Pain First Started Comment -   Aggravating Factors Walking;Standing;Stairs   Aggravating Factors Comment gettting in and out the bed   Limiting Behavior -   Relieving Factors -   Relieving Factors Comment -   Result of Injury No   Work-Related Injury -   Type of Injury -     Occupation, etc:  Mr. Megha Valdez works as a  for [employer] for [employer] and has an  background. He lives with his wife, 45-year-old son, and 22-year-old daughter in the [place] [address]. His son works at [employer] and his daughter was studying sociology at [employer]. He has high blood pressure. He is not diabetic. He reports that he has gained 40 pounds over the past 2 years. Current weight is 355 pounds. He is 6'5\" tall. His PCP is Dr. Rianna Olivas. He reports a family h/o arthritis.      Weight Metrics 6/6/2022 3/17/2022 2/10/2022 1/3/2022 12/27/2021 12/20/2021 9/27/2021   Weight 348 lb 12.8 oz 355 lb 346 lb 12.8 oz 346 lb 346 lb 4.8 oz 345 lb 6.4 oz 340 lb   BMI 41.36 kg/m2 42.1 kg/m2 41.12 kg/m2 41.03 kg/m2 41.07 kg/m2 40.96 kg/m2 40.32 kg/m2     Patient Active Problem List   Diagnosis Code    Obstructive sleep apnea (adult) (pediatric) G47.33    Obstructive sleep apnea (adult) (pediatric) G47.33    Elevated troponin R77.8    Allergic reaction T78.40XA    Vasovagal episode R55    Troponin level elevated R77.8    Kidney mass N28.89    Hypotension I95.9    Renal cancer (HCC) C64.9    Obesity (BMI 30.0-34. 9) E66.9     REVIEW OF SYSTEMS: All Below are Negative except: See HPI   Constitutional: negative for fever, chills, and weight loss. Cardiovascular: negative for chest pain, claudication, leg swelling, SOB, TURK   Gastrointestinal: Negative for pain, N/V/C/D, Blood in stool or urine, dysuria,  hematuria, incontinence, pelvic pain. Musculoskeletal: See HPI   Neurological: Negative for dizziness and weakness. Negative for headaches, Visual changes, confusion, seizures   Phychiatric/Behavioral: Negative for depression, memory loss, substance  abuse. Extremities: Negative for hair changes, rash, or skin lesion changes. Hematologic: Negative for bleeding problems, bruising, pallor or swollen lymph  nodes   Peripheral Vascular: No calf pain, no circulation deficits. Social History     Socioeconomic History    Marital status:      Spouse name: Not on file    Number of children: Not on file    Years of education: Not on file    Highest education level: Not on file   Occupational History    Not on file   Tobacco Use    Smoking status: Former Smoker     Packs/day: 0.05     Years: 1.00     Pack years: 0.05     Types: Cigarettes    Smokeless tobacco: Never Used   Substance and Sexual Activity    Alcohol use: Yes     Alcohol/week: 0.0 - 0.8 standard drinks     Comment: occasionally    Drug use: No    Sexual activity: Yes     Partners: Female   Other Topics Concern    Not on file   Social History Narrative    Not on file     Social Determinants of Health     Financial Resource Strain:     Difficulty of Paying Living Expenses: Not on file   Food Insecurity:     Worried About Running Out of Food in the Last Year: Not on file    Nida of Food in the Last Year: Not on file   Transportation Needs:     Lack of Transportation (Medical):  Not on file    Lack of Transportation (Non-Medical): Not on file   Physical Activity: Unknown    Days of Exercise per Week: 0 days    Minutes of Exercise per Session: Not on file   Stress:     Feeling of Stress : Not on file   Social Connections:     Frequency of Communication with Friends and Family: Not on file    Frequency of Social Gatherings with Friends and Family: Not on file    Attends Anglican Services: Not on file    Active Member of [address] [place] [address] or Organizations: Not on file    Attends Club or Organization Meetings: Not on file    Marital Status: Not on file   Intimate Partner Violence: Not At Risk    Fear of Current or Ex-Partner: No    Emotionally Abused: No    Physically Abused: No    Sexually Abused: No   Housing Stability:     Unable to Pay for Housing in the Last Year: Not on file    Number of [place] in the Last Year: Not on file    Unstable Housing in the Last Year: Not on file      Allergies   Allergen Reactions    Benazepril Anaphylaxis    Lisinopril Anaphylaxis    Gadolinium-Containing Contrast Media Itching and Unknown (comments)     Pt had itching, nausea, perfuse sweating, lightheadedness----taken to ed for evaluation      Current Outpatient Medications   Medication Sig    indapamide (LOZOL) 2.5 mg tablet     amLODIPine-benazepril (LOTREL) 5-10 mg per capsule Take 1 Capsule by mouth daily.  meloxicam (MOBIC) 15 mg tablet Take 1 Tab by mouth daily (with breakfast).  benzocaine 10 % oint 1 g by Apply Externally route three (3) times daily as needed.  ibuprofen (MOTRIN) 600 mg tablet Take 1 Tab by mouth every six (6) hours as needed for Pain.  aspirin 81 mg chewable tablet Take 1 Tab by mouth daily.  amLODIPine (NORVASC) 10 mg tablet Take  by mouth daily. No current facility-administered medications for this visit.       PHYSICAL EXAMINATION:  Visit Vitals  Pulse 78   Temp 96.9 °F (36.1 °C) (Temporal)   Resp 16   Ht 6' 5\" (1.956 m)   Wt 348 lb 12.8 oz (158.2 kg)   SpO2 98%   BMI 41.36 kg/m² ORTHO EXAMINATION:  Examination Right hip Left hip   Skin Intact Intact   External Rotation ROM  passive 10 degrees with pain 20   Internal Rotation ROM  passive 5 degrees with pain 10   Trochanteric tenderness - -   Hip flexion contracture - -   Antalgic gait - -   Trendelenberg sign - -   Lumbar tenderness - -   Straight leg raise - -   Calf tenderness - -   Neurovascular Intact Intact      Hip flexor strength on the right moderately weaker than the left tested against [facility] pelvis and a crosstable lateral with her right hip reveals moderate joint space narrowing for femoral acetabular. There are consistent changes cystic type related to underlying probable AVN. There is spurring seen at the superior and inferior rim of the acetabulum. Protrusio deformity noted. No lytic or blastic lesions. No soft tissue ossifications. RADIOGRAPHS:  XR RIGHT HIP 2/10/22 [facility]  IMPRESSION:  AP pelvis and two views - No fractures, moderate joint space narrowing, + osteophytes present. Tonnis grade 2, cystic changes consistent with AVN    XR RIGHT KNEE 1/12/17 [facility]  IMPRESSION:  No fracture or dislocation. Small suprapatellar joint effusion. IMPRESSION:      ICD-10-CM ICD-9-CM    1. Avascular necrosis of bone of right hip (HCC)  M87.051 733.42 AMB POC X-RAY RADEX HIP UNI WITH PELVIS 2-3 VIEWS   2. Right hip pain  M25.551 719.45 AMB POC X-RAY RADEX HIP UNI WITH PELVIS 2-3 VIEWS     PLAN: Patient again counseled on the importance of weight management. He also understands the possibility of needing a right total hip replacement in the future. Regarding his current discomfort associated with his right hip I recommended intra-articular cortisone injection which patient agreed to.   That will be scheduled directly with the hospital.  He is going to discontinue his Aleve and instead trial Voltaren enteric-coated tablets with food 1 p.o. twice daily #60 dispense with 1 refill. MRI ordered of the hip for better assessment based on the cystic changes present on plain film imaging. Today x-rays reviewed copies provided all of his questions answered to his satisfaction.

## 2022-06-27 ENCOUNTER — HOSPITAL ENCOUNTER (OUTPATIENT)
Dept: GENERAL RADIOLOGY | Age: 57
Discharge: HOME | End: 2022-06-27
Attending: PHYSICIAN ASSISTANT
Payer: COMMERCIAL

## 2022-06-27 PROCEDURE — 77002 NEEDLE LOCALIZATION BY XRAY: CPT

## 2022-06-27 PROCEDURE — 74011000636 HC RX REV CODE- 636: Performed by: PHYSICIAN ASSISTANT

## 2022-06-27 PROCEDURE — 74011000250 HC RX REV CODE- 250: Performed by: PHYSICIAN ASSISTANT

## 2022-06-27 PROCEDURE — 74011250636 HC RX REV CODE- 250/636: Performed by: PHYSICIAN ASSISTANT

## 2022-06-27 RX ORDER — LIDOCAINE HYDROCHLORIDE 10 MG/ML
6 INJECTION, SOLUTION EPIDURAL; INFILTRATION; INTRACAUDAL; PERINEURAL
Status: COMPLETED | OUTPATIENT
Start: 2022-06-27 | End: 2022-06-27

## 2022-06-27 RX ORDER — TRIAMCINOLONE ACETONIDE 40 MG/ML
40 INJECTION, SUSPENSION INTRA-ARTICULAR; INTRAMUSCULAR
Status: COMPLETED | OUTPATIENT
Start: 2022-06-27 | End: 2022-06-27

## 2022-06-27 RX ADMIN — LIDOCAINE HYDROCHLORIDE 6 ML: 10 INJECTION, SOLUTION EPIDURAL; INFILTRATION; INTRACAUDAL; PERINEURAL at 10:43

## 2022-06-27 RX ADMIN — TRIAMCINOLONE ACETONIDE 40 MG: 40 INJECTION, SUSPENSION INTRA-ARTICULAR; INTRAMUSCULAR at 10:43

## 2022-06-27 RX ADMIN — IOPAMIDOL 2 ML: 408 INJECTION, SOLUTION INTRATHECAL at 10:43

## 2022-06-29 ENCOUNTER — HOSPITAL ENCOUNTER (OUTPATIENT)
Dept: MRI IMAGING | Age: 57
Discharge: HOME | End: 2022-06-29
Attending: PHYSICIAN ASSISTANT
Payer: COMMERCIAL

## 2022-06-29 PROCEDURE — 73721 MRI JNT OF LWR EXTRE W/O DYE: CPT

## 2022-09-30 ENCOUNTER — OFFICE VISIT (OUTPATIENT)
Dept: ORTHOPEDIC SURGERY | Age: 57
End: 2022-09-30
Payer: COMMERCIAL

## 2022-09-30 VITALS
BODY MASS INDEX: 37.19 KG/M2 | HEART RATE: 90 BPM | HEIGHT: 77 IN | TEMPERATURE: 97.9 F | WEIGHT: 315 LBS | OXYGEN SATURATION: 96 %

## 2022-09-30 DIAGNOSIS — M25.551 HIP PAIN, RIGHT: ICD-10-CM

## 2022-09-30 DIAGNOSIS — M16.11 PRIMARY OSTEOARTHRITIS OF RIGHT HIP: Primary | ICD-10-CM

## 2022-09-30 PROCEDURE — 20610 DRAIN/INJ JOINT/BURSA W/O US: CPT | Performed by: SPECIALIST

## 2022-09-30 RX ORDER — BETAMETHASONE SODIUM PHOSPHATE AND BETAMETHASONE ACETATE 3; 3 MG/ML; MG/ML
3 INJECTION, SUSPENSION INTRA-ARTICULAR; INTRALESIONAL; INTRAMUSCULAR; SOFT TISSUE ONCE
Status: COMPLETED | OUTPATIENT
Start: 2022-09-30 | End: 2022-09-30

## 2022-09-30 RX ADMIN — BETAMETHASONE SODIUM PHOSPHATE AND BETAMETHASONE ACETATE 3 MG: 3; 3 INJECTION, SUSPENSION INTRA-ARTICULAR; INTRALESIONAL; INTRAMUSCULAR; SOFT TISSUE at 16:25

## 2022-09-30 NOTE — PROGRESS NOTES
Patient: Kathy Bullock                MRN: [id number]       SSN: [id number]  YOB: 1965        AGE: 56 y.o. SEX: male    PCP: Valeria Alexander MD  09/30/22    CC: BILATERAL HIP AND GROIN PAIN    HISTORY:  Kathy Bullock is a 56 y.o. male who is seen for bilateral hip and groin pain R>L. His pains were previously only on the right. He has been experiencing hip and groin pain for the past several months. He does not recall any injury. He feels pain in his hips with standing and walking. He had to take some time off due to his constant groin pain. He has difficulty getting comfortable in bed. He reports some relief with an intraarterial injection he was given at the hospital.    He is also seen for bilateral right knee pain. His pain was previously only on the right. He felt sudden left knee pain when he stood up while calling [employer] a couple of months ago. He feels left knee pain with movement. He feels pain with standing, walking and stair climbing. He experiences startup pain after sitting. He feels knee pain and stiffness at night and when holding his knee in flexion for long periods of time. He reports knee pain while using an incline treadmill at the gym. He takes ibuprofen and Aleve for knee pain with benefit. He was previously seen for left elbow pain. He does not recall any injury. He has been experiencing left elbow pain and swelling for the past few weeks. He feels elbow pain mostly with applied pressure. He has a h/o gout. Pain Assessment  9/30/2022   Location of Pain Knee; Hip   Location Modifiers Right   Severity of Pain 5   Quality of Pain Sharp   Quality of Pain Comment -   Duration of Pain Other (Comment)   Frequency of Pain Intermittent   Date Pain First Started (No Data)   Date Pain First Started Comment f/u   Aggravating Factors Other (Comment); Walking   Aggravating Factors Comment laying down.    Limiting Behavior No   Relieving Factors NSAID   Relieving Factors Comment -   Result of Injury No   Work-Related Injury -   Type of Injury -     Occupation, etc:  Mr. Greg Zhou works as a  for [employer] for [employer] and has an  background. He lives with his wife, 51-year-old son, and 51-year-old daughter in the [place] [address]. His son works at [employer] and his daughter was studying sociology at [employer]. He has high blood pressure. He is not diabetic. He reports that he has gained 40 pounds over the past 2 years. Current weight is 357 pounds. He is 6'5\" tall. His PCP is Dr. Crystal Kauffman. He reports a family h/o arthritis. He reports that he just started going back to the gym this past Monday. His insurance does not cover bariatric surgery. Weight Metrics 9/30/2022 6/6/2022 3/17/2022 2/10/2022 1/3/2022 12/27/2021 12/20/2021   Weight 357 lb 348 lb 12.8 oz 355 lb 346 lb 12.8 oz 346 lb 346 lb 4.8 oz 345 lb 6.4 oz   BMI 42.33 kg/m2 41.36 kg/m2 42.1 kg/m2 41.12 kg/m2 41.03 kg/m2 41.07 kg/m2 40.96 kg/m2     Patient Active Problem List   Diagnosis Code    Obstructive sleep apnea (adult) (pediatric) G47.33    Obstructive sleep apnea (adult) (pediatric) G47.33    Elevated troponin R77.8    Allergic reaction T78.40XA    Vasovagal episode R55    Troponin level elevated R77.8    Kidney mass N28.89    Hypotension I95.9    Renal cancer (HCC) C64.9    Obesity (BMI 30.0-34. 9) E66.9     REVIEW OF SYSTEMS: All Below are Negative except: See HPI   Constitutional: negative for fever, chills, and weight loss. Cardiovascular: negative for chest pain, claudication, leg swelling, SOB, TURK   Gastrointestinal: Negative for pain, N/V/C/D, Blood in stool or urine, dysuria,  hematuria, incontinence, pelvic pain. Musculoskeletal: See HPI   Neurological: Negative for dizziness and weakness.    Negative for headaches, Visual changes, confusion, seizures   Phychiatric/Behavioral: Negative for depression, memory loss, substance  abuse. Extremities: Negative for hair changes, rash, or skin lesion changes. Hematologic: Negative for bleeding problems, bruising, pallor or swollen lymph  nodes   Peripheral Vascular: No calf pain, no circulation deficits. Social History     Socioeconomic History    Marital status:      Spouse name: Not on file    Number of children: Not on file    Years of education: Not on file    Highest education level: Not on file   Occupational History    Not on file   Tobacco Use    Smoking status: Former     Packs/day: 0.05     Years: 1.00     Pack years: 0.05     Types: Cigarettes    Smokeless tobacco: Never   Substance and Sexual Activity    Alcohol use: Yes     Alcohol/week: 0.0 - 0.8 standard drinks     Comment: occasionally    Drug use: No    Sexual activity: Yes     Partners: Female   Other Topics Concern    Not on file   Social History Narrative    Not on file     Social Determinants of Health     Financial Resource Strain: Not on file   Food Insecurity: Not on file   Transportation Needs: Not on file   Physical Activity: Unknown    Days of Exercise per Week: 0 days    Minutes of Exercise per Session: Not on file   Stress: Not on file   Social Connections: Not on file   Intimate Partner Violence: Not At Risk    Fear of Current or Ex-Partner: No    Emotionally Abused: No    Physically Abused: No    Sexually Abused: No   Housing Stability: Not on file      Allergies   Allergen Reactions    Benazepril Anaphylaxis    Lisinopril Anaphylaxis    Gadolinium-Containing Contrast Media Itching and Unknown (comments)     Pt had itching, nausea, perfuse sweating, lightheadedness----taken to ed for evaluation      Current Outpatient Medications   Medication Sig    diclofenac EC (VOLTAREN) 75 mg EC tablet Take 1 Tablet by mouth two (2) times a day. indapamide (LOZOL) 2.5 mg tablet     amLODIPine-benazepril (LOTREL) 5-10 mg per capsule Take 1 Capsule by mouth daily.     meloxicam (MOBIC) 15 mg tablet Take 1 Tab by mouth daily (with breakfast). benzocaine 10 % oint 1 g by Apply Externally route three (3) times daily as needed. ibuprofen (MOTRIN) 600 mg tablet Take 1 Tab by mouth every six (6) hours as needed for Pain. aspirin 81 mg chewable tablet Take 1 Tab by mouth daily. amLODIPine (NORVASC) 10 mg tablet Take  by mouth daily. No current facility-administered medications for this visit. PHYSICAL EXAMINATION:  Visit Vitals  Pulse 90   Temp 97.9 °F (36.6 °C) (Temporal)   Ht 6' 5\" (1.956 m)   Wt (!) 357 lb (161.9 kg)   SpO2 96%   BMI 42.33 kg/m²      ORTHO EXAMINATION:  Examination Right hip Left hip   Skin Intact Intact   External Rotation ROM 10 20   Internal Rotation ROM 0 10   Trochanteric tenderness - -   Hip flexion contracture - -   Antalgic gait - -   Trendelenberg sign - -   Lumbar tenderness - -   Straight leg raise - -   Calf tenderness - -   Neurovascular Intact Intact      TIME OUT:  Chart reviewed for the following:   I, Trenton Wooten MD, have reviewed the History, Physical and updated the Allergic reactions for [address] performed immediately prior to start of procedure:  Monroe Knight MD, have performed the following reviews on Paulene Phoenix prior to the start of the procedure:          * Patient was identified by name and date of birth   * Agreement on procedure being performed was verified  * Risks and Benefits explained to the patient  * Procedure site verified and marked as necessary  * Patient was positioned for comfort  * Consent was obtained     Time: 4:15 PM     Date of procedure: 9/30/2022  Procedure performed by:  Trenton Wooten MD  Mr. Casandra Rosas tolerated the procedure well with no complications. MRI RIGHT HIP WO CONT 6/29/22   IMPRESSION  Moderate bilateral hip osteoarthritis, right greater than left; disproportionally involving the posterior femoral acetabular articulation.     Mild bilateral hip joint effusion. RADIOGRAPHS:  XR RIGHT HIP 2/10/22 [facility]  IMPRESSION:  AP pelvis and two views - No fractures, moderate joint space narrowing, + osteophytes present. Tonnis grade 2, cystic changes consistent with AVN    XR RIGHT KNEE 1/12/17 [facility]  IMPRESSION:  No fracture or dislocation. Small suprapatellar joint effusion. IMPRESSION:      ICD-10-CM ICD-9-CM    1. Primary osteoarthritis of right hip  M16.11 715.15 betamethasone (CELESTONE) injection 3 mg      DRAIN/INJECT LARGE JOINT/BURSA      2. Hip pain, right  M25.551 719.45 betamethasone (CELESTONE) injection 3 mg      DRAIN/INJECT LARGE JOINT/BURSA        PLAN: After discussing treatment options, patient's right lateral hip was injected with 4 cc Marcaine and 1/2 cc Celestone. There is no need for surgery at this time. He will follow up as needed.     Scribed by Tony Bajwa ([address]) as dictated by Mickey Villela MD

## 2022-12-19 ENCOUNTER — OFFICE VISIT (OUTPATIENT)
Dept: ORTHOPEDIC SURGERY | Age: 57
End: 2022-12-19
Payer: COMMERCIAL

## 2022-12-19 VITALS
BODY MASS INDEX: 37.19 KG/M2 | OXYGEN SATURATION: 94 % | WEIGHT: 315 LBS | TEMPERATURE: 97.6 F | HEART RATE: 90 BPM | HEIGHT: 77 IN

## 2022-12-19 DIAGNOSIS — M25.551 CHRONIC PAIN OF RIGHT HIP: ICD-10-CM

## 2022-12-19 DIAGNOSIS — E66.01 CLASS 3 SEVERE OBESITY DUE TO EXCESS CALORIES WITHOUT SERIOUS COMORBIDITY WITH BODY MASS INDEX (BMI) OF 40.0 TO 44.9 IN ADULT (HCC): ICD-10-CM

## 2022-12-19 DIAGNOSIS — M16.11 PRIMARY OSTEOARTHRITIS OF RIGHT HIP: Primary | ICD-10-CM

## 2022-12-19 DIAGNOSIS — M70.61 TROCHANTERIC BURSITIS OF RIGHT HIP: ICD-10-CM

## 2022-12-19 DIAGNOSIS — G89.29 CHRONIC PAIN OF RIGHT HIP: ICD-10-CM

## 2022-12-19 RX ORDER — BETAMETHASONE SODIUM PHOSPHATE AND BETAMETHASONE ACETATE 3; 3 MG/ML; MG/ML
3 INJECTION, SUSPENSION INTRA-ARTICULAR; INTRALESIONAL; INTRAMUSCULAR; SOFT TISSUE ONCE
Status: COMPLETED | OUTPATIENT
Start: 2022-12-19 | End: 2022-12-19

## 2022-12-19 RX ADMIN — BETAMETHASONE SODIUM PHOSPHATE AND BETAMETHASONE ACETATE 3 MG: 3; 3 INJECTION, SUSPENSION INTRA-ARTICULAR; INTRALESIONAL; INTRAMUSCULAR; SOFT TISSUE at 14:16

## 2022-12-19 NOTE — PROGRESS NOTES
Patient: Skip Grossman                MRN: [id number]       SSN: [id number]  YOB: 1965        AGE: 56 y.o. SEX: male    PCP: Iva Corado MD  12/19/22    Chief Complaint   Patient presents with    Hip Pain     Right  Groin pain        HISTORY:  Skip Grossman is a 56 y.o. male who is seen for bilateral hip and groin pain, R>L. He states his pains were so severe last night that he could not sleep and could barely walk. His pains were previously only on the right. He has been experiencing hip and groin pain for the past several months. He does not recall any injury. He feels pain in his hips with standing and walking. He had to take some time off due to his constant groin pain. He has difficulty getting comfortable in bed. He reports some relief with an intraarticular injection he was given at the hospital. He denies any relief with oral medications. He is also seen for bilateral knee pain. His pain was previously only on the right. He felt sudden left knee pain when he stood up while calling [employer] a couple of months ago. He feels left knee pain with movement. He feels pain with standing, walking and stair climbing. He experiences startup pain after sitting. He feels knee pain and stiffness at night and when holding his knee in flexion for long periods of time. He reports knee pain while using an incline treadmill at the gym. He takes ibuprofen and Aleve for knee pain with benefit. He was previously seen for left elbow pain -- doing well. He does not recall any injury. He has been experiencing left elbow pain and swelling for the past few weeks. He feels elbow pain mostly with applied pressure. He notes a lump on the left elbow. He has a h/o gout. Pain Assessment  9/30/2022   Location of Pain Knee; Hip   Location Modifiers Right   Severity of Pain 5   Quality of Pain Sharp   Quality of Pain Comment -   Duration of Pain Other (Comment)   Frequency of Pain Intermittent   Date Pain First Started (No Data)   Date Pain First Started Comment f/u   Aggravating Factors Other (Comment); Walking   Aggravating Factors Comment laying down. Limiting Behavior No   Relieving Factors NSAID   Relieving Factors Comment -   Result of Injury No   Work-Related Injury -   Type of Injury -     Occupation, etc:  Mr. Lisa Swift  works as a  for [employer] for [employer] and has an  background. He lives with his wife, 35-year-old son, and 17-year-old daughter in the [place] [address]. His son works at [employer] and his daughter was studying sociology at [employer]. Mr. Lisa Swift weighs 356 lbs and is 6'5\" tall. His hip and knee pains make it hard to exercise. He is hypertensive but not diabetic. His PCP is Dr. Ondina Karimi. He reports a family h/o arthritis. He reports that he just started going back to the gym this past Monday. His insurance does not cover bariatric surgery. No results found for: HBA1C, WPD6ARWN, HIM6JVRM, DXL6VXJU  Weight Metrics 12/19/2022 9/30/2022 6/6/2022 3/17/2022 2/10/2022 1/3/2022 12/27/2021   Weight 356 lb 357 lb 348 lb 12.8 oz 355 lb 346 lb 12.8 oz 346 lb 346 lb 4.8 oz   BMI 42.22 kg/m2 42.33 kg/m2 41.36 kg/m2 42.1 kg/m2 41.12 kg/m2 41.03 kg/m2 41.07 kg/m2       Patient Active Problem List   Diagnosis Code    Obstructive sleep apnea (adult) (pediatric) G47.33    Obstructive sleep apnea (adult) (pediatric) G47.33    Elevated troponin R77.8    Allergic reaction T78.40XA    Vasovagal episode R55    Troponin level elevated R77.8    Kidney mass N28.89    Hypotension I95.9    Renal cancer (HCC) C64.9    Obesity (BMI 30.0-34. 9) E66.9     REVIEW OF SYSTEMS:    Constitutional Symptoms: Negative   Eyes: Negative   Ears, Nose, Throat and Mouth: Negative   Cardiovascular: Negative   Respiratory: Negative   Genitourinary: Per HPI   Gastrointestinal: Per HPI   Integumentary (Skin and/or Breast): Negative   Musculoskeletal: Per HPI   Endocrine/Rheumatologic: Negative   Neurological: Per HPI   Hematology/Lymphatic: Negative    Allergic/Immunologic: Negative   Phychiatric: Negative    Social History     Socioeconomic History    Marital status:      Spouse name: Not on file    Number of children: Not on file    Years of education: Not on file    Highest education level: Not on file   Occupational History    Not on file   Tobacco Use    Smoking status: Former     Packs/day: 0.05     Years: 1.00     Pack years: 0.05     Types: Cigarettes    Smokeless tobacco: Never   Substance and Sexual Activity    Alcohol use: Yes     Alcohol/week: 0.0 - 0.8 standard drinks     Comment: occasionally    Drug use: No    Sexual activity: Yes     Partners: Female   Other Topics Concern    Not on file   Social History Narrative    Not on file     Social Determinants of Health     Financial Resource Strain: Not on file   Food Insecurity: Not on file   Transportation Needs: Not on file   Physical Activity: Unknown    Days of Exercise per Week: 0 days    Minutes of Exercise per Session: Not on file   Stress: Not on file   Social Connections: Not on file   Intimate Partner Violence: Not At Risk    Fear of Current or Ex-Partner: No    Emotionally Abused: No    Physically Abused: No    Sexually Abused: No   Housing Stability: Not on file      Allergies   Allergen Reactions    Benazepril Anaphylaxis    Lisinopril Anaphylaxis    Gadolinium-Containing Contrast Media Itching and Unknown (comments)     Pt had itching, nausea, perfuse sweating, lightheadedness----taken to ed for evaluation      Current Outpatient Medications   Medication Sig    diclofenac EC (VOLTAREN) 75 mg EC tablet Take 1 Tablet by mouth two (2) times a day. indapamide (LOZOL) 2.5 mg tablet     amLODIPine-benazepril (LOTREL) 5-10 mg per capsule Take 1 Capsule by mouth daily. meloxicam (MOBIC) 15 mg tablet Take 1 Tab by mouth daily (with breakfast).     benzocaine 10 % oint 1 g by Apply Externally route three (3) times daily as needed. ibuprofen (MOTRIN) 600 mg tablet Take 1 Tab by mouth every six (6) hours as needed for Pain. aspirin 81 mg chewable tablet Take 1 Tab by mouth daily. amLODIPine (NORVASC) 10 mg tablet Take  by mouth daily. No current facility-administered medications for this visit. PHYSICAL EXAMINATION:  Visit Vitals  Pulse 90   Temp 97.6 °F (36.4 °C) (Temporal)   Ht 6' 5\" (1.956 m)   Wt (!) 356 lb (161.5 kg)   SpO2 94%   BMI 42.22 kg/m²      ORTHO EXAMINATION:  Examination Right hip Left hip   Skin Intact Intact   External Rotation ROM 10 20   Internal Rotation ROM 0 10   Trochanteric tenderness + -   Hip flexion contracture - -   Antalgic gait - -   Trendelenberg sign - -   Lumbar tenderness - -   Straight leg raise - -   Calf tenderness - -   Neurovascular Intact Intact   Well-healed surgical scar on right inferolateral abdomen from excision of cancerous renal cyst in 2013    TIME OUT:  Chart reviewed for the following:   Violet Pitts MD, have reviewed the History, Physical and updated the Allergic reactions for [address] performed immediately prior to start of procedure:  Violet Pitts MD, have performed the following reviews on Ursula Moreno prior to the start of the procedure:          * Patient was identified by name and date of birth   * Agreement on procedure being performed was verified  * Risks and Benefits explained to the patient  * Procedure site verified and marked as necessary  * Patient was positioned for comfort  * Consent was obtained     Time: 2:10 PM     Date of procedure: 12/19/2022  Procedure performed by:  Cassie Clements MD  Mr. Grayson Spaulding tolerated the procedure well with no complications. MRI RIGHT HIP WO CONT 6/29/22   IMPRESSION  Moderate bilateral hip osteoarthritis, right greater than left; disproportionally involving the posterior femoral acetabular articulation.      Mild bilateral hip joint effusion. RADIOGRAPHS:  XR RIGHT HIP 2/10/22 [facility]  IMPRESSION:  AP pelvis and two views - No fractures, moderate joint space narrowing, + osteophytes present. Tonnis grade 2, cystic changes consistent with AVN     XR RIGHT KNEE 1/12/17 [facility]  IMPRESSION:  No fracture or dislocation. Small suprapatellar joint effusion. IMPRESSION:      ICD-10-CM ICD-9-CM    1. Primary osteoarthritis of right hip  M16.11 715.15 betamethasone (CELESTONE) injection 3 mg      REFERRAL TO BARIATRICS      2. Chronic pain of right hip  M25.551 719.45 betamethasone (CELESTONE) injection 3 mg    G89.29 338.29       3. Trochanteric bursitis of right hip  M70.61 726.5 betamethasone (CELESTONE) injection 3 mg      4. Class 3 severe obesity due to excess calories without serious comorbidity with body mass index (BMI) of 40.0 to 44.9 in adult ([facility])  E66.01 278.01 REFERRAL TO BARIATRICS    Z68.41 V85.41         PLAN:  After discussing treatment options, patient's right trochanteric bursa was injected with 4 cc Polocaine and 1/2 cc Celestone. We discussed possible need for right hip arthroplasty at some time in the future if pain continues. He will be referred for bariatric surgery consultation. He will follow up as needed.       Scribed by Destin Cunningham ([address]) as dictated by Chiquita Willis MD

## 2023-01-04 ENCOUNTER — HOSPITAL ENCOUNTER (EMERGENCY)
Age: 58
Discharge: HOME | End: 2023-01-04
Attending: EMERGENCY MEDICINE
Payer: COMMERCIAL

## 2023-01-04 VITALS
TEMPERATURE: 97.7 F | DIASTOLIC BLOOD PRESSURE: 101 MMHG | HEIGHT: 77 IN | BODY MASS INDEX: 37.19 KG/M2 | WEIGHT: 315 LBS | RESPIRATION RATE: 18 BRPM | SYSTOLIC BLOOD PRESSURE: 149 MMHG | HEART RATE: 71 BPM | OXYGEN SATURATION: 97 %

## 2023-01-04 DIAGNOSIS — I10 HYPERTENSION, UNSPECIFIED TYPE: ICD-10-CM

## 2023-01-04 DIAGNOSIS — M10.021 ACUTE IDIOPATHIC GOUT OF RIGHT ELBOW: Primary | ICD-10-CM

## 2023-01-04 PROCEDURE — 99283 EMERGENCY DEPT VISIT LOW MDM: CPT

## 2023-01-04 RX ORDER — COLCHICINE 0.6 MG/1
TABLET ORAL
Qty: 9 TABLET | Refills: 0 | Status: SHIPPED | OUTPATIENT
Start: 2023-01-04

## 2023-01-04 RX ORDER — METHYLPREDNISOLONE 4 MG/1
TABLET ORAL
Qty: 1 DOSE PACK | Refills: 0 | Status: SHIPPED | OUTPATIENT
Start: 2023-01-04

## 2023-01-04 NOTE — ED TRIAGE NOTES
Patient is ambulatory into triage for c/o right elbow pain x 2 days. Denies any injury. Hx of gout. NAD noted.

## 2023-01-04 NOTE — DISCHARGE INSTRUCTIONS
TAKE YOUR BLOOD PRESSURE MEDICATIONS AS SOON AS YOU GET HOME. Avoid Sunshine urine foods, especially shrimp. Continue Naproxen 2 tabs twice a day as needed.

## 2023-01-04 NOTE — ED PROVIDER NOTES
[facility] (DP/SNF)  Emergency Department Treatment Report        Patient: Josi Marc Age: 56 y.o. Sex: male    YOB: 1965 Admit Date: 1/4/2023 PCP: Jaycee Harrington MD   MRN: [id number]  CSN: [id number]  Attending: Avelino Ba MD      Room: HPIT/PIT Time Dictated: 1:14 PM PA-C: DEANDRA De Leon     Chief Complaint   Elbow pain    History of Present Illness   1:14 PM   56 y.o. male presents to the ED C/O RT elbow pain for the last 2 days. Patient reports that he has a history of gout and he had shrimp on 01/02/2023 and the next day he woke up with RT elbow pain, swelling, and warmth. Patient has been taking Naproxen yesterday with minimal relief. Today the pain is a little worse. No Injury, ever, chills, SOB, difficulty breathing, red streaking, or deformity. Review of Systems   Review of Systems   Constitutional:  Negative for activity change, appetite change, chills, fatigue and fever. HENT:  Negative for congestion, ear pain, rhinorrhea and sore throat. Eyes:  Negative for pain, discharge, redness and itching. Respiratory:  Negative for cough, chest tightness, shortness of breath and wheezing. Cardiovascular:  Negative for chest pain and palpitations. Gastrointestinal:  Negative for abdominal pain, blood in stool, constipation, diarrhea, nausea and vomiting. Endocrine: Negative for polyuria. Genitourinary:  Negative for dysuria, flank pain, hematuria, penile discharge, penile pain and testicular pain. Musculoskeletal:  Positive for arthralgias (right elbow pain). Negative for back pain, joint swelling and neck pain. Skin:  Negative for rash and wound. Allergic/Immunologic: Negative for immunocompromised state. Neurological:  Negative for dizziness, weakness, light-headedness, numbness and headaches. Hematological:  Negative for adenopathy. Psychiatric/Behavioral:  Negative for agitation and confusion. The patient is not nervous/anxious.     Past Medical/Surgical History     Past Medical History:   Diagnosis Date    HOMER (acute kidney injury) ([facility] [address]     Atypical chest pain     Hypertension     Hypokalemia     Obesity (BMI 30.0-34. 9) 4/24/2017    Renal cell carcinoma of right kidney ([facility] [address] 12/23/14    Renal mass, right     Right hip pain     Groin     Past Surgical History:   Procedure Laterality Date    HX NEPHRECTOMY Right 12/23/14    Open Partial, Dr. Cristela Anna, [facility]    HX WISDOM TEETH EXTRACTION         Social History     Social History     Socioeconomic History    Marital status:      Spouse name: Not on file    Number of children: Not on file    Years of education: Not on file    Highest education level: Not on file   Occupational History    Not on file   Tobacco Use    Smoking status: Former     Packs/day: 0.05     Years: 1.00     Pack years: 0.05     Types: Cigarettes    Smokeless tobacco: Never   Substance and Sexual Activity    Alcohol use: Yes     Alcohol/week: 0.0 - 0.8 standard drinks     Comment: occasionally    Drug use: No    Sexual activity: Yes     Partners: Female   Other Topics Concern    Not on file   Social History Narrative    Not on file     Social Determinants of Health     Financial Resource Strain: Not on file   Food Insecurity: Not on file   Transportation Needs: Not on file   Physical Activity: Unknown    Days of Exercise per Week: 0 days    Minutes of Exercise per Session: Not on file   Stress: Not on file   Social Connections: Not on file   Intimate Partner Violence: Not At Risk    Fear of Current or Ex-Partner: No    Emotionally Abused: No    Physically Abused: No    Sexually Abused: No   Housing Stability: Not on file       Family History     Family History   Problem Relation Age of Onset    Cancer Maternal Grandfather         Prostate    Diabetes Maternal Grandmother     Hypertension Mother        Current Medications     Prior to Admission Medications   Prescriptions Last Dose Informant Patient Reported? Taking? amLODIPine (NORVASC) 10 mg tablet   Yes No   Sig: Take  by mouth daily. amLODIPine-benazepril (LOTREL) 5-10 mg per capsule   Yes No   Sig: Take 1 Capsule by mouth daily. aspirin 81 mg chewable tablet   No No   Sig: Take 1 Tab by mouth daily. benzocaine 10 % oint   No No   Si g by Apply Externally route three (3) times daily as needed. diclofenac EC (VOLTAREN) 75 mg EC tablet   No No   Sig: Take 1 Tablet by mouth two (2) times a day. ibuprofen (MOTRIN) 600 mg tablet   No No   Sig: Take 1 Tab by mouth every six (6) hours as needed for Pain. indapamide (LOZOL) 2.5 mg tablet   Yes No   meloxicam (MOBIC) 15 mg tablet   No No   Sig: Take 1 Tab by mouth daily (with breakfast). Facility-Administered Medications: None       Allergies     Allergies   Allergen Reactions    Benazepril Anaphylaxis    Lisinopril Anaphylaxis    Gadolinium-Containing Contrast Media Itching and Unknown (comments)     Pt had itching, nausea, perfuse sweating, lightheadedness----taken to ed for evaluation       Physical Exam   Patient Vitals for the past 8 hrs:   Temp Pulse Resp BP SpO2   23 1325 -- -- -- (!) 149/101 --   23 1021 -- -- -- (!) 175/113 --   23 1018 97.7 °F (36.5 °C) 71 18 -- 97 %       Physical Exam  Vitals and nursing note reviewed. Constitutional:       General: He is not in acute distress. Appearance: He is well-developed. He is not diaphoretic. HENT:      Head: Normocephalic and atraumatic. Right Ear: External ear normal.      Left Ear: External ear normal.      Nose: Nose normal.      Mouth/Throat:      Pharynx: No oropharyngeal exudate. Eyes:      General:         Right eye: No discharge. Left eye: No discharge. Conjunctiva/sclera: Conjunctivae normal.   Cardiovascular:      Rate and Rhythm: Normal rate and regular rhythm. Heart sounds: Normal heart sounds. No murmur heard. No friction rub.    Pulmonary:      Effort: Pulmonary effort is normal. No respiratory distress. Breath sounds: Normal breath sounds. No wheezing or rales. Chest:      Chest wall: No tenderness. Abdominal:      General: Bowel sounds are normal. There is no distension. Palpations: Abdomen is soft. There is no mass. Tenderness: There is no abdominal tenderness. There is no guarding or rebound. Musculoskeletal:         General: Swelling and tenderness present. No deformity or signs of injury. Normal range of motion. Cervical back: Normal range of motion and neck supple. Comments: RT posterior elbow is TTP with edema, mild erythema, and calor. LROM due to pain an edema. No deformity. Lymphadenopathy:      Cervical: No cervical adenopathy. Skin:     General: Skin is warm and dry. Findings: No rash. Neurological:      Mental Status: He is alert and oriented to person, place, and time. Psychiatric:         Behavior: Behavior normal.         Thought Content: Thought content normal.         Judgment: Judgment normal.       Diagnostic Studies   RESULTS:    No orders to display       Labs Reviewed - No data to display    No results found for this or any previous visit (from the past 12 hour(s)). MEDICATIONS GIVEN:  Medications - No data to display      Procedures  Procedures    Impression / ED Course / Medical Decision Making   MDM  Number of Diagnoses or Management Options  Acute idiopathic gout of right elbow: minor  Hypertension, unspecified type: established, worsening  Diagnosis management comments: DDx: elbow pain, elbow strain, elbow fracture, elbow contusion, bursitis, tendinitis. Impression: RT elbow gout    Management Plan: Evaluate and treat RT elbow pain. Prescribed Colcrys, and a Medrol dosepak. Advised to continue Naproxen twice a day as needed for pain. Advised to take his blood pressure medication when he gets home and to F/U with his PCP as needed.   Patient was in agreement with discharge plan and discharged in good condition. Risk of Complications, Morbidity, and/or Mortality  Presenting problems: low  Diagnostic procedures: low  Management options: low    Patient Progress  Patient progress: stable      PROGRESS NOTE:   1:14 PM   Initial assessment completed. DISCHARGE NOTE:  1:29 PM   Vlad Hoskins's  results have been reviewed with him. He has been counseled regarding his diagnosis, treatment, and plan. He verbally conveys understanding and agreement of the signs, symptoms, diagnosis, treatment and prognosis and additionally agrees to follow up as discussed. He also agrees with the care-plan and conveys that all of his questions have been answered. I have also provided discharge instructions for him that include: educational information regarding their diagnosis and treatment, and list of reasons why they would want to return to the ED prior to their follow-up appointment, should his condition change. Final Diagnosis     1. Acute idiopathic gout of right elbow    2. Hypertension, unspecified type        Disposition     Current Discharge Medication List        START taking these medications    Details   colchicine (Colcrys) 0.6 mg tablet Take 2 tablets now, then 1 tablet in an hour. Repeat up to 3 days. Qty: 9 Tablet, Refills: 0  Start date: 1/4/2023      methylPREDNISolone (Medrol, Trent,) 4 mg tablet See back of blister pack for 6 day taper instructions. Qty: 1 Dose Pack, Refills: 0  Start date: 1/4/2023              Follow-up Information       Follow up With Specialties Details Why Contact Info    Kory Rodriguez MD Family Medicine Go in 1 week If no improvement [phone] [facility]                  Nursing notes have been reviewed by the physician/ advanced practice    Clinician.     Aiyana Reis PA-C  January 4, 2023

## 2023-01-04 NOTE — Clinical Note
[facility] EMERGENCY DEPT  [address] [id number] [phone]    Work/School Note    Date: 1/4/2023    To Whom It May concern:    Adair Green was seen and treated today in the emergency room by the following provider(s):  Attending Provider: Rhoda Bolton MD  Physician Assistant: Peyman Fall, [address] [place] [address] is excused from work/school on 01/04/23 and 01/05/23. He is medically clear to return to work/school on 1/6/2023.        Sincerely,          DEANDRA Rosario

## 2023-02-09 ENCOUNTER — OFFICE VISIT (OUTPATIENT)
Dept: ORTHOPEDIC SURGERY | Age: 58
End: 2023-02-09
Payer: COMMERCIAL

## 2023-02-09 VITALS — TEMPERATURE: 97.7 F | BODY MASS INDEX: 37.19 KG/M2 | WEIGHT: 315 LBS | HEIGHT: 77 IN | RESPIRATION RATE: 16 BRPM

## 2023-02-09 DIAGNOSIS — M70.61 TROCHANTERIC BURSITIS OF RIGHT HIP: ICD-10-CM

## 2023-02-09 DIAGNOSIS — M25.551 RIGHT HIP PAIN: Primary | ICD-10-CM

## 2023-02-09 DIAGNOSIS — M54.30 SCIATICA, UNSPECIFIED LATERALITY: ICD-10-CM

## 2023-02-09 DIAGNOSIS — M54.50 LOW BACK PAIN WITHOUT SCIATICA, UNSPECIFIED BACK PAIN LATERALITY, UNSPECIFIED CHRONICITY: ICD-10-CM

## 2023-02-09 DIAGNOSIS — M16.11 PRIMARY OSTEOARTHRITIS OF RIGHT HIP: ICD-10-CM

## 2023-02-09 RX ORDER — BETAMETHASONE SODIUM PHOSPHATE AND BETAMETHASONE ACETATE 3; 3 MG/ML; MG/ML
3 INJECTION, SUSPENSION INTRA-ARTICULAR; INTRALESIONAL; INTRAMUSCULAR; SOFT TISSUE ONCE
Status: COMPLETED | OUTPATIENT
Start: 2023-02-09 | End: 2023-02-09

## 2023-02-09 RX ADMIN — BETAMETHASONE SODIUM PHOSPHATE AND BETAMETHASONE ACETATE 3 MG: 3; 3 INJECTION, SUSPENSION INTRA-ARTICULAR; INTRALESIONAL; INTRAMUSCULAR; SOFT TISSUE at 15:45

## 2023-02-09 NOTE — PROGRESS NOTES
Patient: Macarena Hernandez                MRN: [id number]       SSN: [id number]  YOB: 1965        AGE: 56 y.o. SEX: male     PCP: Nahid Apodaca MD  02/09/23          Chief Complaint   Patient presents with    Hip Pain       Right  Groin pain          HISTORY:  Macarena Hernandez is a 56 y.o. male who is seen for increased bilateral hip and groin pain. He states his pains are so severe at times that he can not sleep and barely walk. His pains were previously only on the right. He has been experiencing hip and groin pain for the past several months. He does not recall any injury. He feels pain in his hips with standing and walking. He had to take some time off due to his constant groin pain. He has difficulty getting comfortable in bed. He reports some relief with an intraarticular injection he was given at the hospital. He denies any relief with oral medications. He is also seen for bilateral knee pain. His pain was previously only on the right. He felt sudden left knee pain when he stood up while calling [employer] a couple of months ago. He feels left knee pain with movement. He feels pain with standing, walking and stair climbing. He experiences startup pain after sitting. He feels knee pain and stiffness at night and when holding his knee in flexion for long periods of time. He reports knee pain while using an incline treadmill at the gym. He takes ibuprofen and Aleve for knee pain with benefit. He was previously seen for left elbow pain -- doing well. He does not recall any injury. He has been experiencing left elbow pain and swelling for the past few weeks. He feels elbow pain mostly with applied pressure. He notes a lump on the left elbow. He has a h/o gout. He states that at times it is difficult for him to walk. He shoes interest inn working out, but can't due to the excess pain. Pain Assessment  9/30/2022   Location of Pain Knee; Hip   Location Modifiers Right   Severity of Pain 5   Quality of Pain Sharp   Quality of Pain Comment -   Duration of Pain Other (Comment)   Frequency of Pain Intermittent   Date Pain First Started (No Data)   Date Pain First Started Comment f/u   Aggravating Factors Other (Comment); Walking   Aggravating Factors Comment laying down. Limiting Behavior No   Relieving Factors NSAID   Relieving Factors Comment -   Result of Injury No   Work-Related Injury -   Type of Injury -      Occupation, etc:  Mr. Nevin Gordillo  works as a  for [employer] for [employer] and has an  background. He lives with his wife, 80-year-old son, and 17-year-old daughter in the [place] [address]. His son works at [employer] and his daughter was studying sociology at [employer]. Mr. Nevin Gordillo weighs 356 lbs and is 6'5\" tall. His hip and knee pains make it hard to exercise. He is hypertensive but not diabetic. His PCP is Dr. Matty Limon. He reports a family h/o arthritis. His insurance does not cover bariatric surgery. No results found for: HBA1C, SGN1CACU, YDK7TZAK, UWT1HHKV  Weight Metrics 12/19/2022 9/30/2022 6/6/2022 3/17/2022 2/10/2022 1/3/2022 12/27/2021   Weight 356 lb 357 lb 348 lb 12.8 oz 355 lb 346 lb 12.8 oz 346 lb 346 lb 4.8 oz   BMI 42.22 kg/m2 42.33 kg/m2 41.36 kg/m2 42.1 kg/m2 41.12 kg/m2 41.03 kg/m2 41.07 kg/m2              Patient Active Problem List   Diagnosis Code    Obstructive sleep apnea (adult) (pediatric) G47.33    Obstructive sleep apnea (adult) (pediatric) G47.33    Elevated troponin R77.8    Allergic reaction T78.40XA    Vasovagal episode R55    Troponin level elevated R77.8    Kidney mass N28.89    Hypotension I95.9    Renal cancer (HCC) C64.9    Obesity (BMI 30.0-34. 9) E66.9      REVIEW OF SYSTEMS:               Constitutional Symptoms: Negative              Eyes: Negative              Ears, Nose, Throat and Mouth: Negative              Cardiovascular: Negative              Respiratory: Negative              Genitourinary: Per HPI              Gastrointestinal: Per HPI              Integumentary (Skin and/or Breast): Negative              Musculoskeletal: Per HPI              Endocrine/Rheumatologic: Negative              Neurological: Per HPI              Hematology/Lymphatic: Negative               Allergic/Immunologic: Negative              Phychiatric: Negative     Social History   Social History            Socioeconomic History    Marital status:        Spouse name: Not on file    Number of children: Not on file    Years of education: Not on file    Highest education level: Not on file   Occupational History    Not on file   Tobacco Use    Smoking status: Former       Packs/day: 0.05       Years: 1.00       Pack years: 0.05       Types: Cigarettes    Smokeless tobacco: Never   Substance and Sexual Activity    Alcohol use:  Yes       Alcohol/week: 0.0 - 0.8 standard drinks       Comment: occasionally    Drug use: No    Sexual activity: Yes       Partners: Female   Other Topics Concern    Not on file   Social History Narrative    Not on file      Social Determinants of Health          Financial Resource Strain: Not on file   Food Insecurity: Not on file   Transportation Needs: Not on file   Physical Activity: Unknown    Days of Exercise per Week: 0 days    Minutes of Exercise per Session: Not on file   Stress: Not on file   Social Connections: Not on file   Intimate Partner Violence: Not At Risk    Fear of Current or Ex-Partner: No    Emotionally Abused: No    Physically Abused: No    Sexually Abused: No   Housing Stability: Not on file               Allergies   Allergen Reactions    Benazepril Anaphylaxis    Lisinopril Anaphylaxis    Gadolinium-Containing Contrast Media Itching and Unknown (comments)       Pt had itching, nausea, perfuse sweating, lightheadedness----taken to ed for evaluation           Current Outpatient Medications   Medication Sig    diclofenac EC (VOLTAREN) 75 mg EC tablet Take 1 Tablet by mouth two (2) times a day. indapamide (LOZOL) 2.5 mg tablet      amLODIPine-benazepril (LOTREL) 5-10 mg per capsule Take 1 Capsule by mouth daily. meloxicam (MOBIC) 15 mg tablet Take 1 Tab by mouth daily (with breakfast). benzocaine 10 % oint 1 g by Apply Externally route three (3) times daily as needed. ibuprofen (MOTRIN) 600 mg tablet Take 1 Tab by mouth every six (6) hours as needed for Pain. aspirin 81 mg chewable tablet Take 1 Tab by mouth daily. amLODIPine (NORVASC) 10 mg tablet Take  by mouth daily. No current facility-administered medications for this visit.       PHYSICAL EXAMINATION:  Visit Vitals  Pulse 90   Temp 97.6 °F (36.4 °C) (Temporal)   Ht 6' 5\" (1.956 m)   Wt (!) 356 lb (161.5 kg)   SpO2 94%   BMI 42.22 kg/m²      ORTHO EXAMINATION:  Examination Right hip Left hip   Skin Intact Intact   External Rotation ROM 10 20   Internal Rotation ROM 0 10   Trochanteric tenderness + -   Hip flexion contracture - -   Antalgic gait - -   Trendelenberg sign - -   Lumbar tenderness - -   Straight leg raise - -   Calf tenderness - -   Neurovascular Intact Intact   Well-healed surgical scar on right inferolateral abdomen from excision of cancerous renal cyst in 2013        Chart reviewed for the following:   I, Tereso Christopher MD, have reviewed the History, Physical and updated the Allergic reactions for [address] [place] [address] performed immediately prior to start of procedure:  Anthony Vallecillo MD, have performed the following reviews on [facility] prior to the start of the procedure:            * Patient was identified by name and date of birth   * Agreement on procedure being performed was verified  * Risks and Benefits explained to the patient  * Procedure site verified and marked as necessary  * Patient was positioned for comfort  * Consent was obtained  Time: 3:17 PM  Date of procedure: 2/9/2023    Procedure performed by:   Elmo    Mr. Earlie Sicard tolerated the procedure well with no complications. MRI RIGHT HIP WO CONT 6/29/22   IMPRESSION  Moderate bilateral hip osteoarthritis, right greater than left; disproportionally involving the posterior femoral acetabular articulation. Mild bilateral hip joint effusion. RADIOGRAPHS:  XR RIGHT HIP 2/10/22 [facility]  IMPRESSION:  AP pelvis and two views - No fractures, moderate joint space narrowing, + osteophytes present. Tonnis grade 2, cystic changes consistent with AVN     XR RIGHT KNEE 1/12/17 [facility]  IMPRESSION:  No fracture or dislocation. Small suprapatellar joint effusion. IMPRESSION:        ICD-10-CM ICD-9-CM     1. Primary osteoarthritis of right hip  M16.11 715.15 betamethasone (CELESTONE) injection 3 mg         REFERRAL TO BARIATRICS       2. Chronic pain of right hip  M25.551 719.45 betamethasone (CELESTONE) injection 3 mg     G89.29 338.29         3. Trochanteric bursitis of right hip  M70.61 726.5 betamethasone (CELESTONE) injection 3 mg       4. Class 3 severe obesity due to excess calories without serious comorbidity with body mass index (BMI) of 40.0 to 44.9 in adult ([facility])  E66.01 278.01 REFERRAL TO BARIATRICS     Z68.41 V85.41            PLAN:  After discussing treatment options, patient's right trochanteric bursa was injected with 4 cc Polocaine and 1/2 cc Celestone. We discussed possible need for right hip arthroplasty at some time in the future if pain continues. He will be referred for bariatric surgery consultation. He will follow up as needed.

## 2023-03-27 ENCOUNTER — OFFICE VISIT (OUTPATIENT)
Age: 58
End: 2023-03-27
Payer: COMMERCIAL

## 2023-03-27 VITALS
BODY MASS INDEX: 37.19 KG/M2 | HEART RATE: 60 BPM | HEIGHT: 77 IN | TEMPERATURE: 97.7 F | OXYGEN SATURATION: 96 % | WEIGHT: 315 LBS

## 2023-03-27 DIAGNOSIS — M16.11 ARTHRITIS OF RIGHT HIP: ICD-10-CM

## 2023-03-27 DIAGNOSIS — E66.01 MORBID (SEVERE) OBESITY DUE TO EXCESS CALORIES (HCC): Primary | ICD-10-CM

## 2023-03-27 PROCEDURE — 99203 OFFICE O/P NEW LOW 30 MIN: CPT | Performed by: PHYSICAL MEDICINE & REHABILITATION

## 2023-03-27 ASSESSMENT — PATIENT HEALTH QUESTIONNAIRE - PHQ9
SUM OF ALL RESPONSES TO PHQ QUESTIONS 1-9: 0
SUM OF ALL RESPONSES TO PHQ9 QUESTIONS 1 & 2: 0
SUM OF ALL RESPONSES TO PHQ QUESTIONS 1-9: 0
1. LITTLE INTEREST OR PLEASURE IN DOING THINGS: 0
SUM OF ALL RESPONSES TO PHQ QUESTIONS 1-9: 0
2. FEELING DOWN, DEPRESSED OR HOPELESS: 0
SUM OF ALL RESPONSES TO PHQ QUESTIONS 1-9: 0

## 2023-03-27 NOTE — PROGRESS NOTES
Anette Mijares presents today for   Chief Complaint   Patient presents with    Back Pain       Is someone accompanying this pt? no    Is the patient using any DME equipment during OV? no    Depression Screening:  No flowsheet data found. Learning Assessment:  No flowsheet data found. Abuse Screening:  No flowsheet data found. Fall Risk  No flowsheet data found. OPIOID RISK TOOL  No flowsheet data found. Coordination of Care:  1. Have you been to the ER, urgent care clinic since your last visit? no  Hospitalized since your last visit? no    2. Have you seen or consulted any other health care providers outside of the [address] [place] [address] since your last visit? no Include any pap smears or colon screening.  no

## 2023-03-27 NOTE — PROGRESS NOTES
[facility] [address]  [address], [address]  [place], [address] [place] [address] Street  Phone: [phone]  Fax: [phone]      Patient: Lizeth Lock                                                                              MRN: [id number]        YOB: 1965          AGE: 56 y. o. PCP: Nati Gilmore MD  Date:  03/27/23    Reason for Consultation: Back Pain      HPI:  Lizeth Lock is a 56 y.o. male with relevant PMH of right renal cancer s/p  partial nephrectomy on right, HTN, gout, who presents with right sided back pain. He has been seeing Dr Arabella Moore and Misha Key for right hip pain and tried IA hip injections and GT bursa injections with temporary relief. He is considering right ABELINO but needs to lose weight first.  He was referred here for his low back pain. Today he reports minimal back pain        Neurologic symptoms: No numbness, tingling, weakness, bowel or bladder changes. No recent falls      Location: The pain is located in the low back pain    Radiation: The pain does radiate right groin. Pain Score: Currently: 3/10  At Best: 2/10  At Worst: 8/10   Quality: Pain is of a aching tight pulling quality. Aggravating: Pain is exacerbated by sitting and standing sit to stand  Alleviating: The pain is alleviated by sitting    Prior Treatments:  Physical therapy: No  Injections:Yes  GT bursa injections Dr. Arabella Moore - last 2/9/2023- helped for a few weeks  IA hip injections 6/27/22  - helped 3-4 months  Surgery:No  Previous Medications:   Current Medications: Aleve  Previous work-up has included:   MRI right hip 6/29/2022  Moderate bilateral hip osteoarthritis, right greater than left;   disproportionally involving the posterior femoral acetabular articulation. CT lumbar spine 10/15/2014   Axial scans demonstrate an element of congenital caudal lumbar canal stenosis. There is a subtle grade 1 retrolisthesis of L5 on S1.   No

## 2023-05-04 ENCOUNTER — OFFICE VISIT (OUTPATIENT)
Age: 58
End: 2023-05-04

## 2023-05-04 VITALS — BODY MASS INDEX: 37.19 KG/M2 | WEIGHT: 315 LBS | TEMPERATURE: 98.4 F | HEIGHT: 77 IN

## 2023-05-04 DIAGNOSIS — M70.61 TROCHANTERIC BURSITIS, RIGHT HIP: ICD-10-CM

## 2023-05-04 DIAGNOSIS — M16.11 ARTHRITIS OF RIGHT HIP: ICD-10-CM

## 2023-05-04 DIAGNOSIS — Z87.39 HISTORY OF GOUT: ICD-10-CM

## 2023-05-04 DIAGNOSIS — M17.12 PRIMARY OSTEOARTHRITIS OF LEFT KNEE: ICD-10-CM

## 2023-05-04 DIAGNOSIS — M70.42 PREPATELLAR BURSITIS OF LEFT KNEE: Primary | ICD-10-CM

## 2023-05-04 RX ORDER — BETAMETHASONE SODIUM PHOSPHATE AND BETAMETHASONE ACETATE 3; 3 MG/ML; MG/ML
3 INJECTION, SUSPENSION INTRA-ARTICULAR; INTRALESIONAL; INTRAMUSCULAR; SOFT TISSUE ONCE
Status: COMPLETED | OUTPATIENT
Start: 2023-05-04 | End: 2023-05-04

## 2023-05-04 RX ADMIN — BETAMETHASONE SODIUM PHOSPHATE AND BETAMETHASONE ACETATE 3 MG: 3; 3 INJECTION, SUSPENSION INTRA-ARTICULAR; INTRALESIONAL; INTRAMUSCULAR; SOFT TISSUE at 16:07

## 2023-05-04 NOTE — PROGRESS NOTES
Patient: Rupert Harrison                MRN: [id number]       SSN: [id number]  YOB: 1965        AGE: 66 y.o. SEX: male      PCP: Stefan Maldonado MD  05/04/23    Chief Complaint   Patient presents with    Hip Pain     Right      Knee Pain     Left           HISTORY:  Rupert Harrison is a 66 y.o. male who is seen for right hip and left knee pains. His hip hurts more than his knee. He does not recall any injury. He responded to his injection last ov but his pains have returned. He also responded to an intraarticular right hip injection in the past.  He states his pains are so severe at times that he can not sleep and barely  walk. His pains were previously only on the right. He has been experiencing hip and groin pain for the past several months. He does not recall any injury. He feels pain in his hips with standing and walking. He had to take some time  off due to his constant groin pain. He has difficulty getting comfortable in bed. His knee pain was previously only on the right. 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 He feels left knee pain with movement. He feels pain with standing, walking and stair climbing. He experiences startup pain after sitting. He feels knee pain and stiffness at night and when  holding his knee in flexion for long periods of time. He reports knee pain while using an incline treadmill at the gym. He was previously seen for left elbow pain -- doing well. He has a h/o gout. He states that at times it is difficult for him to walk. He shoes interest inn working out, but can't due to the excess pain. Occupation, etc: Mr. Sepideh Agrawal  works as a  for [employer] for [employer] and has an  background. He lives with his wife, 66-year-old son, and 66-year-old daughter in the [place] [address].   His son works as a manager at [employer]

## 2023-05-23 ENCOUNTER — TELEPHONE (OUTPATIENT)
Age: 58
End: 2023-05-23

## 2023-05-23 DIAGNOSIS — M16.11 UNILATERAL PRIMARY OSTEOARTHRITIS, RIGHT HIP: Primary | ICD-10-CM

## 2023-05-23 DIAGNOSIS — M25.551 PAIN IN RIGHT HIP: ICD-10-CM

## 2023-05-23 NOTE — TELEPHONE ENCOUNTER
Patient is asking how much weight he would need to lose before he can have surgery on his right hip. Patient is also asking if he can be given another intraarticular right hip injection. Patient is asking for a call back, and can be reached at [phone].

## 2023-05-23 NOTE — TELEPHONE ENCOUNTER
Left message for pt--will ask Dr Libra Perez when he returns to office tomorrow--hip injection was ordered in [facility] has pending

## 2023-05-24 NOTE — TELEPHONE ENCOUNTER
Discussed with Dr Juanito Roberts needs at least 100lb wgt loss --OK to order rt hip intra-articular injection--spoke with pt--he understood--order was placed for hip injection--pt given central scheduling #

## 2023-06-20 ENCOUNTER — HOSPITAL ENCOUNTER (OUTPATIENT)
Facility: HOSPITAL | Age: 58
Discharge: HOME | End: 2023-06-23
Attending: SPECIALIST
Payer: COMMERCIAL

## 2023-06-20 DIAGNOSIS — M16.11 UNILATERAL PRIMARY OSTEOARTHRITIS, RIGHT HIP: ICD-10-CM

## 2023-06-20 DIAGNOSIS — M25.551 PAIN IN RIGHT HIP: ICD-10-CM

## 2023-06-20 PROCEDURE — 2500000003 HC RX 250 WO HCPCS: Performed by: SPECIALIST

## 2023-06-20 PROCEDURE — 6360000002 HC RX W HCPCS: Performed by: SPECIALIST

## 2023-06-20 PROCEDURE — 6360000004 HC RX CONTRAST MEDICATION: Performed by: SPECIALIST

## 2023-06-20 PROCEDURE — 20610 DRAIN/INJ JOINT/BURSA W/O US: CPT

## 2023-06-20 RX ORDER — TRIAMCINOLONE ACETONIDE 40 MG/ML
40 INJECTION, SUSPENSION INTRA-ARTICULAR; INTRAMUSCULAR ONCE
Status: COMPLETED | OUTPATIENT
Start: 2023-06-20 | End: 2023-06-20

## 2023-06-20 RX ORDER — LIDOCAINE HYDROCHLORIDE 10 MG/ML
10 INJECTION, SOLUTION EPIDURAL; INFILTRATION; INTRACAUDAL; PERINEURAL ONCE
Status: COMPLETED | OUTPATIENT
Start: 2023-06-20 | End: 2023-06-20

## 2023-06-20 RX ADMIN — IOPAMIDOL 10 ML: 408 INJECTION, SOLUTION INTRATHECAL at 08:53

## 2023-06-20 RX ADMIN — LIDOCAINE HYDROCHLORIDE 10 ML: 10 INJECTION, SOLUTION EPIDURAL; INFILTRATION; INTRACAUDAL; PERINEURAL at 08:53

## 2023-06-20 RX ADMIN — TRIAMCINOLONE ACETONIDE 40 MG: 40 INJECTION, SUSPENSION INTRA-ARTICULAR; INTRAMUSCULAR at 08:54

## 2023-08-15 ENCOUNTER — HOSPITAL ENCOUNTER (EMERGENCY)
Facility: HOSPITAL | Age: 58
Discharge: HOME | End: 2023-08-15
Attending: STUDENT IN AN ORGANIZED HEALTH CARE EDUCATION/TRAINING PROGRAM
Payer: COMMERCIAL

## 2023-08-15 VITALS
TEMPERATURE: 98.4 F | HEIGHT: 77 IN | DIASTOLIC BLOOD PRESSURE: 100 MMHG | HEART RATE: 60 BPM | SYSTOLIC BLOOD PRESSURE: 150 MMHG | OXYGEN SATURATION: 98 % | WEIGHT: 315 LBS | BODY MASS INDEX: 37.19 KG/M2 | RESPIRATION RATE: 17 BRPM

## 2023-08-15 DIAGNOSIS — M62.838 SPASM OF MUSCLE: ICD-10-CM

## 2023-08-15 DIAGNOSIS — S39.012A STRAIN OF LUMBAR REGION, INITIAL ENCOUNTER: Primary | ICD-10-CM

## 2023-08-15 DIAGNOSIS — I10 ESSENTIAL HYPERTENSION: ICD-10-CM

## 2023-08-15 PROCEDURE — 6360000002 HC RX W HCPCS: Performed by: NURSE PRACTITIONER

## 2023-08-15 PROCEDURE — 99284 EMERGENCY DEPT VISIT MOD MDM: CPT

## 2023-08-15 PROCEDURE — 96372 THER/PROPH/DIAG INJ SC/IM: CPT

## 2023-08-15 RX ORDER — CYCLOBENZAPRINE HCL 10 MG
10 TABLET ORAL 3 TIMES DAILY PRN
Qty: 30 TABLET | Refills: 0 | Status: SHIPPED | OUTPATIENT
Start: 2023-08-15

## 2023-08-15 RX ORDER — KETOROLAC TROMETHAMINE 15 MG/ML
30 INJECTION, SOLUTION INTRAMUSCULAR; INTRAVENOUS ONCE
Status: COMPLETED | OUTPATIENT
Start: 2023-08-15 | End: 2023-08-15

## 2023-08-15 RX ORDER — IBUPROFEN 800 MG/1
800 TABLET ORAL
Qty: 30 TABLET | Refills: 0 | Status: SHIPPED | OUTPATIENT
Start: 2023-08-15

## 2023-08-15 RX ADMIN — KETOROLAC TROMETHAMINE 30 MG: 15 INJECTION, SOLUTION INTRAMUSCULAR; INTRAVENOUS at 12:24

## 2023-08-15 ASSESSMENT — ENCOUNTER SYMPTOMS
RHINORRHEA: 0
APNEA: 0
TROUBLE SWALLOWING: 0
FACIAL SWELLING: 0
CHEST TIGHTNESS: 0
BACK PAIN: 1
SINUS PRESSURE: 0
BLOOD IN STOOL: 0
ANAL BLEEDING: 0
SHORTNESS OF BREATH: 0
ABDOMINAL DISTENTION: 0
EYES NEGATIVE: 1
SINUS PAIN: 0
VOMITING: 0
SORE THROAT: 0
ABDOMINAL PAIN: 0
NAUSEA: 0
CONSTIPATION: 0
DIARRHEA: 0

## 2023-08-15 ASSESSMENT — PAIN SCALES - GENERAL: PAINLEVEL_OUTOF10: 6

## 2023-08-15 ASSESSMENT — PAIN - FUNCTIONAL ASSESSMENT: PAIN_FUNCTIONAL_ASSESSMENT: 0-10

## 2023-08-15 NOTE — DISCHARGE INSTRUCTIONS
Use lots of heat and stretching. Take medication as prescribed. Follow-up with your primary care physician within 2 days for reassessment. Bring the results from this visit with you for their review. Return to the ED immediately for any new, worsening, or persistent symptoms, including fever, vomiting, or any other medical concerns.

## 2023-08-15 NOTE — ED NOTES
Discharge instructions reviewed with patient. Patient verbalized understanding. Patient advised to follow up as directed on discharge instructions. Patient denies questions, needs or concerns at this time. Patient verbalized understanding. No s/sx of distress noted.        Roxane Angeles RN  08/15/23 [id number]

## 2023-08-15 NOTE — ED PROVIDER NOTES
EMERGENCY DEPARTMENT HISTORY AND PHYSICAL EXAM    12:31 PM      Date: 8/15/2023  Patient Name: Lora Dick    History of Presenting Illness     Chief Complaint   Patient presents with    Back Pain         History Provided By: Patient and medical chart review. Additional History (Context): Lora Dick is a 66 y.o. male with   Past Medical History:   Diagnosis Date    EBONY (acute kidney injury) ([address])     Atypical chest pain     Hypertension     Hypokalemia     Obesity (BMI 30.0-34. 9) 4/24/2017    Renal cell carcinoma ([address])     Renal cell carcinoma of right kidney ([address]) 12/23/14    Renal mass, right     Right hip pain     Groin   who presents with complaints of lower back pain that started today. States he was getting up for work in his usual routine and when he went to get up from the toilet he started having lower back pain and spasms. States they were cramping and spasms to his lower back states it was a 9 out of 10 when it occurred. That when he went to take out states the pain got a little bit better. States he called out of work and needs a work note. Patient reports the pain is currently 5 out of 10. Did not take any medications for the pain. Patient denies any chest pain or shortness of breath. Pt denies fever/chills, recent injury/trauma. Denies any urinary/bowel incontinence/retention, or saddle anesthesia. PCP: Madhavi White MD    No current facility-administered medications for this encounter.      Current Outpatient Medications   Medication Sig Dispense Refill    cyclobenzaprine (FLEXERIL) 10 MG tablet Take 1 tablet by mouth 3 times daily as needed for Muscle spasms 30 tablet 0    ibuprofen (ADVIL;MOTRIN) 800 MG tablet Take 1 tablet by mouth 3 times daily (with meals) 30 tablet 0    diclofenac sodium (VOLTAREN) 1 % GEL Apply 2 g topically 4 times daily 100 g 4    indomethacin (INDOCIN) 50 MG capsule       metoprolol succinate (TOPROL XL) 100 MG extended release

## 2023-08-15 NOTE — ED TRIAGE NOTES
Pt ambulatory to triage c/o bilateral lower back pain since this morning. Pt denies hx of back problems. Pt reports using the bathroom and his back started cramping up when he got up.

## 2023-11-13 ENCOUNTER — OFFICE VISIT (OUTPATIENT)
Age: 58
End: 2023-11-13

## 2023-11-13 VITALS — BODY MASS INDEX: 37.19 KG/M2 | TEMPERATURE: 98.3 F | HEIGHT: 77 IN | WEIGHT: 315 LBS

## 2023-11-13 DIAGNOSIS — M16.11 UNILATERAL PRIMARY OSTEOARTHRITIS, RIGHT HIP: Primary | ICD-10-CM

## 2023-11-13 DIAGNOSIS — M87.051 AVASCULAR NECROSIS OF BONE OF RIGHT HIP (HCC): ICD-10-CM

## 2023-11-13 DIAGNOSIS — M70.61 TROCHANTERIC BURSITIS, RIGHT HIP: ICD-10-CM

## 2023-11-13 DIAGNOSIS — M17.12 PRIMARY OSTEOARTHRITIS OF LEFT KNEE: ICD-10-CM

## 2023-11-13 RX ORDER — BETAMETHASONE SODIUM PHOSPHATE AND BETAMETHASONE ACETATE 3; 3 MG/ML; MG/ML
3 INJECTION, SUSPENSION INTRA-ARTICULAR; INTRALESIONAL; INTRAMUSCULAR; SOFT TISSUE ONCE
Status: COMPLETED | OUTPATIENT
Start: 2023-11-13 | End: 2023-11-13

## 2023-11-13 RX ADMIN — BETAMETHASONE SODIUM PHOSPHATE AND BETAMETHASONE ACETATE 3 MG: 3; 3 INJECTION, SUSPENSION INTRA-ARTICULAR; INTRALESIONAL; INTRAMUSCULAR; SOFT TISSUE at 13:47

## 2023-11-13 NOTE — PROGRESS NOTES
Patient: Niesha Weinstein                MRN: [id number]       SSN: [id number]  YOB: 1965        AGE: 66 y.o. SEX: male      PCP: Elma Patino MD  11/13/23    Chief Complaint   Patient presents with    Hip Pain     Right       HISTORY:  Niesha Weinstein is a 66 y.o. male who is seen for increased right hip and left knee pains. Patient responded to a right hip intraarticular injection on 6/20/23, but his pains have returned. He notes that his left knee has been popping more frequently recently. He expresses that the popping is not painful. His hip hurts more than his knee. He does not recall any injury. He has responded to hip cortisone injections in the past. He also responded to an intraarticular right hip injection in the past.  He states his pains are so severe at times that he can not sleep and barely  walk. His pains were previously only on the right. He has been experiencing hip and groin pain for the past several months. He does not recall any injury. He feels pain in his hips with standing and walking. He had to take some time  off due to his constant groin pain. He has difficulty laying on his right side in bed. He has been focusing on weight loss. He looked into bariatric surgery, but his insurance would not approve the procedure. His knee pain was previously only on the right. He felt sudden left knee pain when he stood up while calling [employer] last year. He feels left knee pain with movement. He reports knee pain while using an incline treadmill at the gym. He was previously seen for left elbow pain -- doing well. He has a h/o gout. He states that at times it is difficult for him to walk. He shoes interest inn working out, but can't due to the excess pain. He plans to start working out with a  after the first of the year.     Occupation, etc: Mr. Orion Dubon  works as a  for [employer] for

## 2023-12-05 ENCOUNTER — TELEPHONE (OUTPATIENT)
Age: 58
End: 2023-12-05

## 2023-12-05 DIAGNOSIS — M16.11 UNILATERAL PRIMARY OSTEOARTHRITIS, RIGHT HIP: Primary | ICD-10-CM

## 2023-12-05 DIAGNOSIS — M70.61 TROCHANTERIC BURSITIS, RIGHT HIP: ICD-10-CM

## 2023-12-05 DIAGNOSIS — M87.051 AVASCULAR NECROSIS OF BONE OF RIGHT HIP (HCC): ICD-10-CM

## 2023-12-05 NOTE — TELEPHONE ENCOUNTER
LVM for patient letting them know that those orders was put in and someone from [facility] should give them a call and if any questions or concerns they can call our office.

## 2023-12-05 NOTE — TELEPHONE ENCOUNTER
Patient is asking for an order to have a right hip injection done at [facility]. Patient can be reached at [phone].

## 2023-12-08 ENCOUNTER — HOSPITAL ENCOUNTER (OUTPATIENT)
Facility: HOSPITAL | Age: 58
Discharge: HOME | End: 2023-12-08
Payer: COMMERCIAL

## 2023-12-08 DIAGNOSIS — M16.11 UNILATERAL PRIMARY OSTEOARTHRITIS, RIGHT HIP: ICD-10-CM

## 2023-12-08 DIAGNOSIS — M87.051 AVASCULAR NECROSIS OF BONE OF RIGHT HIP (HCC): ICD-10-CM

## 2023-12-08 DIAGNOSIS — M70.61 TROCHANTERIC BURSITIS, RIGHT HIP: ICD-10-CM

## 2023-12-08 PROCEDURE — 2500000003 HC RX 250 WO HCPCS

## 2023-12-08 PROCEDURE — 20610 DRAIN/INJ JOINT/BURSA W/O US: CPT

## 2023-12-08 PROCEDURE — 6360000004 HC RX CONTRAST MEDICATION

## 2023-12-08 RX ORDER — LIDOCAINE HYDROCHLORIDE 10 MG/ML
5 INJECTION, SOLUTION EPIDURAL; INFILTRATION; INTRACAUDAL; PERINEURAL ONCE
Status: COMPLETED | OUTPATIENT
Start: 2023-12-08 | End: 2023-12-08

## 2023-12-08 RX ORDER — METHYLPREDNISOLONE ACETATE 40 MG/ML
40 INJECTION, SUSPENSION INTRA-ARTICULAR; INTRALESIONAL; INTRAMUSCULAR; SOFT TISSUE ONCE
Status: ACTIVE | OUTPATIENT
Start: 2023-12-08

## 2023-12-08 RX ADMIN — LIDOCAINE HYDROCHLORIDE 5 ML: 10 INJECTION, SOLUTION EPIDURAL; INFILTRATION; INTRACAUDAL; PERINEURAL at 15:19

## 2023-12-08 RX ADMIN — IOPAMIDOL 5 ML: 612 INJECTION, SOLUTION INTRAVENOUS at 15:17

## 2024-01-25 ENCOUNTER — OFFICE VISIT (OUTPATIENT)
Age: 59
End: 2024-01-25

## 2024-01-25 VITALS — TEMPERATURE: 98.4 F | BODY MASS INDEX: 37.19 KG/M2 | HEIGHT: 77 IN | WEIGHT: 315 LBS

## 2024-01-25 DIAGNOSIS — M25.551 PAIN IN RIGHT HIP: ICD-10-CM

## 2024-01-25 DIAGNOSIS — M16.11 ARTHRITIS OF RIGHT HIP: ICD-10-CM

## 2024-01-25 DIAGNOSIS — M17.12 UNILATERAL PRIMARY OSTEOARTHRITIS, LEFT KNEE: ICD-10-CM

## 2024-01-25 DIAGNOSIS — G89.29 CHRONIC PAIN OF LEFT KNEE: Primary | ICD-10-CM

## 2024-01-25 DIAGNOSIS — M25.562 CHRONIC PAIN OF LEFT KNEE: Primary | ICD-10-CM

## 2024-01-25 DIAGNOSIS — M22.42 CHONDROMALACIA, PATELLA, LEFT: ICD-10-CM

## 2024-01-25 DIAGNOSIS — M87.00 AVN (AVASCULAR NECROSIS OF BONE) (HCC): ICD-10-CM

## 2024-01-25 RX ORDER — BUPIVACAINE HYDROCHLORIDE 5 MG/ML
4 INJECTION, SOLUTION PERINEURAL ONCE
Status: COMPLETED | OUTPATIENT
Start: 2024-01-25 | End: 2024-01-25

## 2024-01-25 RX ORDER — BETAMETHASONE SODIUM PHOSPHATE AND BETAMETHASONE ACETATE 3; 3 MG/ML; MG/ML
3 INJECTION, SUSPENSION INTRA-ARTICULAR; INTRALESIONAL; INTRAMUSCULAR; SOFT TISSUE ONCE
Status: COMPLETED | OUTPATIENT
Start: 2024-01-25 | End: 2024-01-25

## 2024-01-25 RX ADMIN — BETAMETHASONE SODIUM PHOSPHATE AND BETAMETHASONE ACETATE 3 MG: 3; 3 INJECTION, SUSPENSION INTRA-ARTICULAR; INTRALESIONAL; INTRAMUSCULAR; SOFT TISSUE at 16:43

## 2024-01-25 RX ADMIN — BUPIVACAINE HYDROCHLORIDE 20 MG: 5 INJECTION, SOLUTION PERINEURAL at 16:44

## 2024-01-25 NOTE — PROGRESS NOTES
joint effusion.     PROCEDURE: Left knee injected 4 cc 0.25% Marcaine and 0.5 cc Celestone.  Chart reviewed for the following:   Sly JIMENEZ MD, have reviewed the History, Physical and updated the Allergic reactions for Ramsey Ortega     TIME OUT performed immediately prior to start of procedure:  Sly JIMENEZ MD, have performed the following reviews on Ramsey Ortega prior to the start of the procedure:            * Patient was identified by name and date of birth   * Agreement on procedure being performed was verified  * Risks and Benefits explained to the patient  * Procedure site verified and marked as necessary  * Patient was positioned for comfort  * Consent was obtained  Time: 4:42 PM  Date of procedure: 1/25/2024    Procedure performed by:  Dr. Savage    Mr. Ortega tolerated the procedure well with no complications.    IMPRESSION:      ICD-10-CM    1. Chronic pain of left knee  M25.562 [72065] Knee 3V    G89.29 DRAIN/INJECT LARGE JOINT/BURSA     BUPivacaine (MARCAINE) 0.5 % injection 20 mg     betamethasone acetate-betamethasone sodium phosphate (CELESTONE) injection 3 mg      2. Chondromalacia, patella, left  M22.42 DRAIN/INJECT LARGE JOINT/BURSA     BUPivacaine (MARCAINE) 0.5 % injection 20 mg     betamethasone acetate-betamethasone sodium phosphate (CELESTONE) injection 3 mg      3. Arthritis of right hip  M16.11 FL GUIDED NEEDLE PLACEMENT      4. AVN (avascular necrosis of bone) ([facility])  M87.00 FL GUIDED NEEDLE PLACEMENT      5. Pain in right hip  M25.551 FL GUIDED NEEDLE PLACEMENT      6. Unilateral primary osteoarthritis, left knee  M17.12 DRAIN/INJECT LARGE JOINT/BURSA     BUPivacaine (MARCAINE) 0.5 % injection 20 mg     betamethasone acetate-betamethasone sodium phosphate (CELESTONE) injection 3 mg     Ambulatory Referral to Ortho Injection        PLAN:  Left knee injected 4 cc 0.25% Marcaine and 0.5 cc Celestone. Right hip IR injection ordered. Consider

## 2024-02-14 ENCOUNTER — HOSPITAL ENCOUNTER (OUTPATIENT)
Facility: HOSPITAL | Age: 59
Discharge: HOME | End: 2024-02-17
Attending: SPECIALIST
Payer: COMMERCIAL

## 2024-02-14 DIAGNOSIS — M16.11 ARTHRITIS OF RIGHT HIP: ICD-10-CM

## 2024-02-14 DIAGNOSIS — M87.00 AVN (AVASCULAR NECROSIS OF BONE) (HCC): ICD-10-CM

## 2024-02-14 DIAGNOSIS — M25.551 PAIN IN RIGHT HIP: ICD-10-CM

## 2024-02-14 PROCEDURE — 6360000004 HC RX CONTRAST MEDICATION: Performed by: SPECIALIST

## 2024-02-14 PROCEDURE — 2500000003 HC RX 250 WO HCPCS: Performed by: SPECIALIST

## 2024-02-14 PROCEDURE — 6360000002 HC RX W HCPCS: Performed by: SPECIALIST

## 2024-02-14 PROCEDURE — 77002 NEEDLE LOCALIZATION BY XRAY: CPT

## 2024-02-14 PROCEDURE — 20610 DRAIN/INJ JOINT/BURSA W/O US: CPT

## 2024-02-14 RX ORDER — IOPAMIDOL 408 MG/ML
10 INJECTION, SOLUTION INTRATHECAL
Status: COMPLETED | OUTPATIENT
Start: 2024-02-14 | End: 2024-02-14

## 2024-02-14 RX ORDER — LIDOCAINE HYDROCHLORIDE 10 MG/ML
10 INJECTION, SOLUTION EPIDURAL; INFILTRATION; INTRACAUDAL; PERINEURAL
Status: COMPLETED | OUTPATIENT
Start: 2024-02-14 | End: 2024-02-14

## 2024-02-14 RX ORDER — TRIAMCINOLONE ACETONIDE 40 MG/ML
40 INJECTION, SUSPENSION INTRA-ARTICULAR; INTRAMUSCULAR
Status: COMPLETED | OUTPATIENT
Start: 2024-02-14 | End: 2024-02-14

## 2024-02-14 RX ADMIN — TRIAMCINOLONE ACETONIDE 40 MG: 40 INJECTION, SUSPENSION INTRA-ARTICULAR; INTRAMUSCULAR at 11:26

## 2024-02-14 RX ADMIN — LIDOCAINE HYDROCHLORIDE 10 ML: 10 INJECTION, SOLUTION EPIDURAL; INFILTRATION; INTRACAUDAL; PERINEURAL at 11:26

## 2024-02-14 RX ADMIN — IOPAMIDOL 10 ML: 408 INJECTION, SOLUTION INTRATHECAL at 11:26

## 2024-02-21 ENCOUNTER — TELEPHONE (OUTPATIENT)
Age: 59
End: 2024-02-21

## 2024-02-21 NOTE — TELEPHONE ENCOUNTER
Message left for pt to return call. Attempting to contact pt to ask him about the cortisone injection and if its still work or has worn off.   Attempting to get his gel injection for his left knee approved and they need to know if the cortisone injection is still helping with the pain or if it has returned.

## 2024-05-13 NOTE — PROGRESS NOTES
Patient: Ramsey Ortega                MRN: [id number]       SSN: [id number]  YOB: 1965        AGE: 59 y.o.        SEX: male      PCP: Justin Abebe MD  05/16/24    Chief Complaint   Patient presents with    Hip Pain     right     HISTORY:  Ramsey Ortega is a 59 y.o. male who is seen for increased right hip and left knee pain. He does not recall any recent hip or knee injury.  He states his hip pains are so severe at times that he can not sleep and barely walk. He has been experiencing hip and groin pain for the past several months. He feels pain in his hip and knee with standing and walking. He has difficulty laying on his right side in bed. He experiences startup pain after sitting. He responded to a right hip IR injection on 2/14/24 but his hip pain has returned. He experiences popping and catching in his knee when he walks.  He felt sudden left knee pain when he stood up while calling [email or web address] last year. He feels left knee pain with movement. He reports knee pain while using an incline treadmill at the gym. He believes his left knee pain is caused by his gait pattern due to his right hip pain.     He has been focusing on weight loss. He looked into bariatric surgery, but his insurance would not approve the procedure.  He expresses interest in working out, but can't due to his hip pain.         He was previously seen for left elbow pain -- doing well.  He has a h/o gout.     Occupation, etc: Mr. Ortega  works as a  for [employer] for [employer] and has an  background.  He lives with his wife, 23-year-old son, and 19-year-old daughter in the [facility].  His son works as a manager at [employer] and his daughter graduated from [employer] and now works for [employer].  Mr. Ortega weighs 332 lbs and is 6'5\" tall. He has tried to lose weight through diet changes. He has looked into weight loss

## 2024-05-16 ENCOUNTER — OFFICE VISIT (OUTPATIENT)
Age: 59
End: 2024-05-16
Payer: COMMERCIAL

## 2024-05-16 VITALS — BODY MASS INDEX: 37.19 KG/M2 | HEIGHT: 77 IN | WEIGHT: 315 LBS | TEMPERATURE: 97.5 F

## 2024-05-16 DIAGNOSIS — M22.42 CHONDROMALACIA, PATELLA, LEFT: ICD-10-CM

## 2024-05-16 DIAGNOSIS — M25.551 PAIN IN RIGHT HIP: ICD-10-CM

## 2024-05-16 DIAGNOSIS — M25.859 FEMORAL ACETABULAR IMPINGEMENT: Primary | ICD-10-CM

## 2024-05-16 DIAGNOSIS — M16.11 UNILATERAL PRIMARY OSTEOARTHRITIS, RIGHT HIP: ICD-10-CM

## 2024-05-16 DIAGNOSIS — M25.562 CHRONIC PAIN OF LEFT KNEE: ICD-10-CM

## 2024-05-16 DIAGNOSIS — M17.12 UNILATERAL PRIMARY OSTEOARTHRITIS, LEFT KNEE: ICD-10-CM

## 2024-05-16 DIAGNOSIS — G89.29 CHRONIC PAIN OF LEFT KNEE: ICD-10-CM

## 2024-05-16 PROCEDURE — 99213 OFFICE O/P EST LOW 20 MIN: CPT | Performed by: SPECIALIST

## 2024-06-06 ENCOUNTER — HOSPITAL ENCOUNTER (OUTPATIENT)
Facility: HOSPITAL | Age: 59
Discharge: HOME | End: 2024-06-06
Attending: SPECIALIST
Payer: COMMERCIAL

## 2024-06-06 DIAGNOSIS — M16.11 UNILATERAL PRIMARY OSTEOARTHRITIS, RIGHT HIP: ICD-10-CM

## 2024-06-06 DIAGNOSIS — M25.551 PAIN IN RIGHT HIP: ICD-10-CM

## 2024-06-06 PROCEDURE — 6360000002 HC RX W HCPCS: Performed by: SPECIALIST

## 2024-06-06 PROCEDURE — 77002 NEEDLE LOCALIZATION BY XRAY: CPT

## 2024-06-06 PROCEDURE — 20610 DRAIN/INJ JOINT/BURSA W/O US: CPT

## 2024-06-06 PROCEDURE — 2500000003 HC RX 250 WO HCPCS: Performed by: SPECIALIST

## 2024-06-06 PROCEDURE — 6360000004 HC RX CONTRAST MEDICATION: Performed by: SPECIALIST

## 2024-06-06 RX ORDER — IOPAMIDOL 408 MG/ML
10 INJECTION, SOLUTION INTRATHECAL
Status: COMPLETED | OUTPATIENT
Start: 2024-06-06 | End: 2024-06-06

## 2024-06-06 RX ORDER — TRIAMCINOLONE ACETONIDE 40 MG/ML
40 INJECTION, SUSPENSION INTRA-ARTICULAR; INTRAMUSCULAR ONCE
Status: COMPLETED | OUTPATIENT
Start: 2024-06-06 | End: 2024-06-06

## 2024-06-06 RX ORDER — LIDOCAINE HYDROCHLORIDE 10 MG/ML
5 INJECTION, SOLUTION EPIDURAL; INFILTRATION; INTRACAUDAL; PERINEURAL ONCE
Status: COMPLETED | OUTPATIENT
Start: 2024-06-06 | End: 2024-06-06

## 2024-06-06 RX ADMIN — LIDOCAINE HYDROCHLORIDE 5 ML: 10 INJECTION, SOLUTION EPIDURAL; INFILTRATION; INTRACAUDAL; PERINEURAL at 08:32

## 2024-06-06 RX ADMIN — IOPAMIDOL 2 ML: 408 INJECTION, SOLUTION INTRATHECAL at 08:33

## 2024-06-06 RX ADMIN — TRIAMCINOLONE ACETONIDE 40 MG: 40 INJECTION, SUSPENSION INTRA-ARTICULAR; INTRAMUSCULAR at 08:35

## 2024-08-20 ENCOUNTER — HOSPITAL ENCOUNTER (OUTPATIENT)
Facility: HOSPITAL | Age: 59
Discharge: HOME | End: 2024-08-23

## 2024-08-20 ENCOUNTER — OFFICE VISIT (OUTPATIENT)
Age: 59
End: 2024-08-20
Payer: COMMERCIAL

## 2024-08-20 VITALS — RESPIRATION RATE: 14 BRPM | BODY MASS INDEX: 37.19 KG/M2 | HEIGHT: 77 IN | WEIGHT: 315 LBS

## 2024-08-20 DIAGNOSIS — M99.01 CERVICAL SOMATIC DYSFUNCTION: ICD-10-CM

## 2024-08-20 DIAGNOSIS — M99.03 SOMATIC DYSFUNCTION OF LUMBAR REGION: ICD-10-CM

## 2024-08-20 DIAGNOSIS — M99.09 SOMATIC DYSFUNCTION OF ABDOMINAL REGION: ICD-10-CM

## 2024-08-20 DIAGNOSIS — M25.811 SHOULDER IMPINGEMENT, RIGHT: ICD-10-CM

## 2024-08-20 DIAGNOSIS — M99.04 SACRAL REGION SOMATIC DYSFUNCTION: ICD-10-CM

## 2024-08-20 DIAGNOSIS — M25.811 SHOULDER IMPINGEMENT, RIGHT: Primary | ICD-10-CM

## 2024-08-20 DIAGNOSIS — M99.06 LOWER LIMB REGION SOMATIC DYSFUNCTION: ICD-10-CM

## 2024-08-20 DIAGNOSIS — M99.02 THORACIC REGION SOMATIC DYSFUNCTION: ICD-10-CM

## 2024-08-20 DIAGNOSIS — M99.07 UPPER EXTREMITY SOMATIC DYSFUNCTION: ICD-10-CM

## 2024-08-20 DIAGNOSIS — M99.08 RIB CAGE REGION SOMATIC DYSFUNCTION: ICD-10-CM

## 2024-08-20 DIAGNOSIS — M99.05 PELVIC REGION SOMATIC DYSFUNCTION: ICD-10-CM

## 2024-08-20 PROCEDURE — 99204 OFFICE O/P NEW MOD 45 MIN: CPT | Performed by: FAMILY MEDICINE

## 2024-08-20 PROCEDURE — 98929 OSTEOPATH MANJ 9-10 REGIONS: CPT | Performed by: FAMILY MEDICINE

## 2024-08-20 RX ORDER — NAPROXEN 500 MG/1
500 TABLET ORAL 2 TIMES DAILY WITH MEALS
Qty: 60 TABLET | Refills: 1 | Status: SHIPPED | OUTPATIENT
Start: 2024-08-20

## 2024-08-20 NOTE — PATIENT INSTRUCTIONS
Either scan this QR code on your smartphone:        Or search YouTube for my channel:    Dr. DANTE Johnson    Rotator cuff

## 2024-08-20 NOTE — PROGRESS NOTES
AVS reviewed: yes,   HEP: AT reviewed  Resources Provided: yes,  YouTube Videos  Patient questions/concerns answered: yes,   Patient verbalized understanding of treatment plan: yes,     
care/form completion but not including time for any procedures/manipulation 50 minutes.

## 2024-08-30 ENCOUNTER — TELEPHONE (OUTPATIENT)
Age: 59
End: 2024-08-30

## 2024-08-30 DIAGNOSIS — M16.11 UNILATERAL PRIMARY OSTEOARTHRITIS, RIGHT HIP: Primary | ICD-10-CM

## 2024-08-30 DIAGNOSIS — M25.551 PAIN IN RIGHT HIP: ICD-10-CM

## 2024-08-30 NOTE — TELEPHONE ENCOUNTER
Patient called to ask if it would be possible to get an order for another FL guided inj in his Rt hip at the hospital again.    Please review and advise patient, [phone]

## 2024-09-16 ENCOUNTER — HOSPITAL ENCOUNTER (OUTPATIENT)
Facility: HOSPITAL | Age: 59
Discharge: HOME | End: 2024-09-19
Attending: SPECIALIST
Payer: COMMERCIAL

## 2024-09-16 DIAGNOSIS — M16.11 UNILATERAL PRIMARY OSTEOARTHRITIS, RIGHT HIP: ICD-10-CM

## 2024-09-16 DIAGNOSIS — M25.551 PAIN IN RIGHT HIP: ICD-10-CM

## 2024-09-16 PROCEDURE — 77002 NEEDLE LOCALIZATION BY XRAY: CPT

## 2024-09-16 PROCEDURE — 6360000002 HC RX W HCPCS: Performed by: SPECIALIST

## 2024-09-16 PROCEDURE — 2500000003 HC RX 250 WO HCPCS: Performed by: SPECIALIST

## 2024-09-16 PROCEDURE — 6360000004 HC RX CONTRAST MEDICATION: Performed by: SPECIALIST

## 2024-09-16 PROCEDURE — 20610 DRAIN/INJ JOINT/BURSA W/O US: CPT

## 2024-09-16 RX ORDER — LIDOCAINE HYDROCHLORIDE 10 MG/ML
10 INJECTION, SOLUTION EPIDURAL; INFILTRATION; INTRACAUDAL; PERINEURAL ONCE
Status: COMPLETED | OUTPATIENT
Start: 2024-09-16 | End: 2024-09-16

## 2024-09-16 RX ORDER — TRIAMCINOLONE ACETONIDE 40 MG/ML
40 INJECTION, SUSPENSION INTRA-ARTICULAR; INTRAMUSCULAR ONCE
Status: COMPLETED | OUTPATIENT
Start: 2024-09-16 | End: 2024-09-16

## 2024-09-16 RX ORDER — IOPAMIDOL 408 MG/ML
10 INJECTION, SOLUTION INTRATHECAL ONCE
Status: COMPLETED | OUTPATIENT
Start: 2024-09-16 | End: 2024-09-16

## 2024-09-16 RX ADMIN — IOPAMIDOL 3 ML: 408 INJECTION, SOLUTION INTRATHECAL at 10:34

## 2024-09-16 RX ADMIN — TRIAMCINOLONE ACETONIDE 40 MG: 40 INJECTION, SUSPENSION INTRA-ARTICULAR; INTRAMUSCULAR at 10:35

## 2024-09-16 RX ADMIN — LIDOCAINE HYDROCHLORIDE 10 ML: 10 INJECTION, SOLUTION EPIDURAL; INFILTRATION; INTRACAUDAL; PERINEURAL at 10:33

## 2024-09-17 ENCOUNTER — OFFICE VISIT (OUTPATIENT)
Age: 59
End: 2024-09-17
Payer: COMMERCIAL

## 2024-09-17 VITALS — WEIGHT: 315 LBS | RESPIRATION RATE: 14 BRPM | BODY MASS INDEX: 37.19 KG/M2 | HEIGHT: 77 IN

## 2024-09-17 DIAGNOSIS — M99.02 THORACIC REGION SOMATIC DYSFUNCTION: ICD-10-CM

## 2024-09-17 DIAGNOSIS — M99.04 SACRAL REGION SOMATIC DYSFUNCTION: ICD-10-CM

## 2024-09-17 DIAGNOSIS — M99.01 CERVICAL SOMATIC DYSFUNCTION: ICD-10-CM

## 2024-09-17 DIAGNOSIS — M99.03 SOMATIC DYSFUNCTION OF LUMBAR REGION: ICD-10-CM

## 2024-09-17 DIAGNOSIS — M99.08 RIB CAGE REGION SOMATIC DYSFUNCTION: ICD-10-CM

## 2024-09-17 DIAGNOSIS — M99.06 LOWER LIMB REGION SOMATIC DYSFUNCTION: ICD-10-CM

## 2024-09-17 DIAGNOSIS — M99.05 PELVIC REGION SOMATIC DYSFUNCTION: ICD-10-CM

## 2024-09-17 DIAGNOSIS — M99.07 UPPER EXTREMITY SOMATIC DYSFUNCTION: ICD-10-CM

## 2024-09-17 DIAGNOSIS — M25.811 SHOULDER IMPINGEMENT, RIGHT: Primary | ICD-10-CM

## 2024-09-17 DIAGNOSIS — M99.09 SOMATIC DYSFUNCTION OF ABDOMINAL REGION: ICD-10-CM

## 2024-09-17 PROCEDURE — 99213 OFFICE O/P EST LOW 20 MIN: CPT | Performed by: FAMILY MEDICINE

## 2024-09-17 PROCEDURE — 98929 OSTEOPATH MANJ 9-10 REGIONS: CPT | Performed by: FAMILY MEDICINE

## 2024-10-10 ENCOUNTER — OFFICE VISIT (OUTPATIENT)
Age: 59
End: 2024-10-10

## 2024-10-10 VITALS — TEMPERATURE: 97 F | WEIGHT: 315 LBS | HEIGHT: 77 IN | BODY MASS INDEX: 37.19 KG/M2

## 2024-10-10 DIAGNOSIS — M25.551 PAIN IN RIGHT HIP: ICD-10-CM

## 2024-10-10 DIAGNOSIS — M87.00 AVN (AVASCULAR NECROSIS OF BONE): ICD-10-CM

## 2024-10-10 DIAGNOSIS — M70.61 TROCHANTERIC BURSITIS, RIGHT HIP: ICD-10-CM

## 2024-10-10 DIAGNOSIS — M16.11 UNILATERAL PRIMARY OSTEOARTHRITIS, RIGHT HIP: Primary | ICD-10-CM

## 2024-10-10 RX ORDER — BUPIVACAINE HYDROCHLORIDE 5 MG/ML
4 INJECTION, SOLUTION PERINEURAL ONCE
Status: COMPLETED | OUTPATIENT
Start: 2024-10-10 | End: 2024-10-10

## 2024-10-10 RX ORDER — BETAMETHASONE SODIUM PHOSPHATE AND BETAMETHASONE ACETATE 3; 3 MG/ML; MG/ML
3 INJECTION, SUSPENSION INTRA-ARTICULAR; INTRALESIONAL; INTRAMUSCULAR; SOFT TISSUE ONCE
Status: COMPLETED | OUTPATIENT
Start: 2024-10-10 | End: 2024-10-10

## 2024-10-10 RX ADMIN — BUPIVACAINE HYDROCHLORIDE 20 MG: 5 INJECTION, SOLUTION PERINEURAL at 14:12

## 2024-10-10 RX ADMIN — BETAMETHASONE SODIUM PHOSPHATE AND BETAMETHASONE ACETATE 3 MG: 3; 3 INJECTION, SUSPENSION INTRA-ARTICULAR; INTRALESIONAL; INTRAMUSCULAR; SOFT TISSUE at 14:12

## 2024-10-10 NOTE — PROGRESS NOTES
tablet Take 1 tablet by mouth daily     Current Facility-Administered Medications   Medication Dose Route Frequency    betamethasone acetate-betamethasone sodium phosphate (CELESTONE) injection 3 mg  3 mg Other Once    BUPivacaine (MARCAINE) 0.5 % injection 20 mg  4 mL Intra-artICUlar Once        PHYSICAL EXAMINATION:  Temp 97 °F (36.1 °C) (Temporal)   Ht 1.956 m (6' 5\")   Wt (!) 156.9 kg (346 lb)   BMI 41.03 kg/m²      ORTHO EXAMINATION:  Examination Right hip Left hip   Skin Intact Intact   External Rotation ROM 10 10   Internal Rotation ROM 5 10   Trochanteric tenderness - -   Hip flexion contracture - -   Antalgic gait - -   Trendelenberg sign - -   Lumbar tenderness - -   Straight leg raise - -   Calf tenderness - -   Neurovascular Intact Intact      Examination Right knee Left knee   Skin Intact Intact   Range of motion 120-0 115-0   Effusion - -   Medial joint line tenderness - +   Lateral joint line tenderness - -   Popliteal tenderness - -   Osteophytes palpable - -   Randy’s - -   Patella crepitus - -   Anterior drawer - -   Lateral laxity - -   Medial laxity - -   Varus deformity - -   Valgus deformity - -   Pretibial edema - ++   Calf tenderness - -      MRI RIGHT HIP WO CONT 6/29/22   IMPRESSION  Moderate bilateral hip osteoarthritis, right greater than left; disproportionally involving the posterior femoral acetabular articulation.      Mild bilateral hip joint effusion.     RADIOGRAPHS:   01/25/24  3 VIEWS LT KNEE MARVIN  Three views of left knee: no fractures, no effusion, Kellgren Stuart grade 2 with moderate joint space narrowing (R>L), no osteophytes present. Slight irregularity of patella articular surface.      XR RIGHT HIP 2/10/22 MARVIN  IMPRESSION:  AP pelvis and two views - No fractures, moderate joint space narrowing, + osteophytes present. Tonnis grade 2, cystic changes consistent with AVN  I independently reviewed these images today.  BRAIN.      XR RIGHT KNEE 1/12/17 [facility]

## 2024-12-10 ENCOUNTER — TELEPHONE (OUTPATIENT)
Age: 59
End: 2024-12-10

## 2024-12-10 DIAGNOSIS — M25.551 PAIN IN RIGHT HIP: ICD-10-CM

## 2024-12-10 DIAGNOSIS — M16.11 UNILATERAL PRIMARY OSTEOARTHRITIS, RIGHT HIP: Primary | ICD-10-CM

## 2024-12-10 NOTE — TELEPHONE ENCOUNTER
Patient called and would like to know if he can have an order place for him to get an hip injection.        Please call and advise patient at   [phone]

## 2025-01-03 ENCOUNTER — HOSPITAL ENCOUNTER (OUTPATIENT)
Facility: HOSPITAL | Age: 60
Discharge: HOME | End: 2025-01-03
Attending: SPECIALIST
Payer: COMMERCIAL

## 2025-01-03 DIAGNOSIS — M16.11 UNILATERAL PRIMARY OSTEOARTHRITIS, RIGHT HIP: ICD-10-CM

## 2025-01-03 DIAGNOSIS — M25.551 PAIN IN RIGHT HIP: ICD-10-CM

## 2025-01-03 PROCEDURE — 6360000002 HC RX W HCPCS: Performed by: SPECIALIST

## 2025-01-03 PROCEDURE — 77002 NEEDLE LOCALIZATION BY XRAY: CPT

## 2025-01-03 PROCEDURE — 6360000004 HC RX CONTRAST MEDICATION: Performed by: SPECIALIST

## 2025-01-03 RX ORDER — LIDOCAINE HYDROCHLORIDE 10 MG/ML
10 INJECTION, SOLUTION EPIDURAL; INFILTRATION; INTRACAUDAL; PERINEURAL ONCE
Status: COMPLETED | OUTPATIENT
Start: 2025-01-03 | End: 2025-01-03

## 2025-01-03 RX ORDER — IOPAMIDOL 408 MG/ML
5 INJECTION, SOLUTION INTRATHECAL
Status: COMPLETED | OUTPATIENT
Start: 2025-01-03 | End: 2025-01-03

## 2025-01-03 RX ORDER — TRIAMCINOLONE ACETONIDE 40 MG/ML
40 INJECTION, SUSPENSION INTRA-ARTICULAR; INTRAMUSCULAR ONCE
Status: COMPLETED | OUTPATIENT
Start: 2025-01-03 | End: 2025-01-03

## 2025-01-03 RX ADMIN — LIDOCAINE HYDROCHLORIDE 10 ML: 10 INJECTION, SOLUTION EPIDURAL; INFILTRATION; INTRACAUDAL; PERINEURAL at 10:35

## 2025-01-03 RX ADMIN — IOPAMIDOL 3 ML: 408 INJECTION, SOLUTION INTRATHECAL at 10:35

## 2025-01-03 RX ADMIN — TRIAMCINOLONE ACETONIDE 40 MG: 40 INJECTION, SUSPENSION INTRA-ARTICULAR; INTRAMUSCULAR at 10:38

## 2025-01-24 NOTE — PROGRESS NOTES
Patient: Ramsey Ortega                MRN: [id number]       SSN: [id number]  YOB: 1965        AGE: 59 y.o.        SEX: male      PCP: Unknown, Provider, BALJINDER  01/27/25    Chief Complaint   Patient presents with    Hip Pain     left     HISTORY:  Ramsey Ortega is a 59 y.o. male who is seen for increased left hip pain. He responded to previous injections but his pains have returned.  His pains were previously mostly on the right. His left hip pain became quite noticeable a few weeks ago. He twisted his left hip when he slipped down a set of stairs at home last week.  His left hip pain has been hurting since his fall. He states his hip pains are so severe at times that he can barely walk & has trouble sleeping. He feels pain in his left hip with standing and walking. He experiences startup left hip pain after sitting. He responded to a right hip IR injection on 1/3/25.     He also has a history of left knee pain.  He felt sudden left knee pain when he stood up while calling [facility] a couple years ago. He believes his left knee pain is caused by altered gait offloading due to his right hip pain. He has a h/o gout.     He has been focusing on weight loss. He looked into bariatric surgery, but his insurance would not approve the procedure.  He is considering Ozempic especially since he has sleep apnea.    He was previously seen for left elbow pain -- doing well.       Occupation, etc: Mr. Ortega  works as a  for [employer] for [employer] and has an  background. He lives with his wife, 23-year-old son, and 19-year-old daughter in the [facility].  His son works as a manager at [employer] and his daughter graduated from [employer] and now works for [employer].  Mr. Ortega weighs 338 lbs and is 6'5\" tall. He has lost 10 pounds by dieting recently.  His hip and knee pains make it hard to exercise. He has

## 2025-01-27 ENCOUNTER — OFFICE VISIT (OUTPATIENT)
Age: 60
End: 2025-01-27
Payer: COMMERCIAL

## 2025-01-27 VITALS — WEIGHT: 315 LBS | TEMPERATURE: 97.3 F | HEIGHT: 77 IN | BODY MASS INDEX: 37.19 KG/M2

## 2025-01-27 DIAGNOSIS — M25.552 HIP PAIN, LEFT: Primary | ICD-10-CM

## 2025-01-27 DIAGNOSIS — M25.859 FEMORAL ACETABULAR IMPINGEMENT: ICD-10-CM

## 2025-01-27 DIAGNOSIS — M16.11 UNILATERAL PRIMARY OSTEOARTHRITIS, RIGHT HIP: ICD-10-CM

## 2025-01-27 DIAGNOSIS — M87.00 AVN (AVASCULAR NECROSIS OF BONE): ICD-10-CM

## 2025-01-27 DIAGNOSIS — M16.12 UNILATERAL PRIMARY OSTEOARTHRITIS, LEFT HIP: ICD-10-CM

## 2025-01-27 DIAGNOSIS — M70.62 TROCHANTERIC BURSITIS, LEFT HIP: ICD-10-CM

## 2025-01-27 DIAGNOSIS — S73.102A HIP SPRAIN, LEFT, INITIAL ENCOUNTER: ICD-10-CM

## 2025-01-27 PROCEDURE — 20610 DRAIN/INJ JOINT/BURSA W/O US: CPT | Performed by: SPECIALIST

## 2025-01-27 PROCEDURE — 99213 OFFICE O/P EST LOW 20 MIN: CPT | Performed by: SPECIALIST

## 2025-01-27 PROCEDURE — 73502 X-RAY EXAM HIP UNI 2-3 VIEWS: CPT | Performed by: SPECIALIST

## 2025-01-27 RX ORDER — BETAMETHASONE SODIUM PHOSPHATE AND BETAMETHASONE ACETATE 3; 3 MG/ML; MG/ML
3 INJECTION, SUSPENSION INTRA-ARTICULAR; INTRALESIONAL; INTRAMUSCULAR; SOFT TISSUE ONCE
Status: COMPLETED | OUTPATIENT
Start: 2025-01-27 | End: 2025-01-27

## 2025-01-27 RX ORDER — BUPIVACAINE HYDROCHLORIDE 5 MG/ML
4 INJECTION, SOLUTION PERINEURAL ONCE
Status: COMPLETED | OUTPATIENT
Start: 2025-01-27 | End: 2025-01-27

## 2025-01-27 RX ADMIN — BUPIVACAINE HYDROCHLORIDE 20 MG: 5 INJECTION, SOLUTION PERINEURAL at 14:01

## 2025-01-27 RX ADMIN — BETAMETHASONE SODIUM PHOSPHATE AND BETAMETHASONE ACETATE 3 MG: 3; 3 INJECTION, SUSPENSION INTRA-ARTICULAR; INTRALESIONAL; INTRAMUSCULAR; SOFT TISSUE at 14:00

## 2025-01-29 ENCOUNTER — TELEPHONE (OUTPATIENT)
Age: 60
End: 2025-01-29

## 2025-01-29 DIAGNOSIS — M16.12 UNILATERAL PRIMARY OSTEOARTHRITIS, LEFT HIP: ICD-10-CM

## 2025-01-29 DIAGNOSIS — M25.552 HIP PAIN, LEFT: Primary | ICD-10-CM

## 2025-01-29 DIAGNOSIS — M25.859 FEMORAL ACETABULAR IMPINGEMENT: ICD-10-CM

## 2025-01-29 NOTE — TELEPHONE ENCOUNTER
Patient called and would like to know if he can have an order placed to get an injection of the left hip done at [facility].        Please call and advise patient at   [phone]

## 2025-02-10 ENCOUNTER — HOSPITAL ENCOUNTER (OUTPATIENT)
Facility: HOSPITAL | Age: 60
Discharge: HOME | End: 2025-02-13
Attending: SPECIALIST
Payer: COMMERCIAL

## 2025-02-10 DIAGNOSIS — M16.12 UNILATERAL PRIMARY OSTEOARTHRITIS, LEFT HIP: ICD-10-CM

## 2025-02-10 DIAGNOSIS — M25.859 FEMORAL ACETABULAR IMPINGEMENT: ICD-10-CM

## 2025-02-10 DIAGNOSIS — M25.552 HIP PAIN, LEFT: ICD-10-CM

## 2025-02-10 PROCEDURE — 20610 DRAIN/INJ JOINT/BURSA W/O US: CPT

## 2025-02-10 PROCEDURE — 77002 NEEDLE LOCALIZATION BY XRAY: CPT

## 2025-02-10 PROCEDURE — 6360000002 HC RX W HCPCS: Performed by: SPECIALIST

## 2025-02-10 PROCEDURE — 6360000004 HC RX CONTRAST MEDICATION: Performed by: SPECIALIST

## 2025-02-10 RX ORDER — TRIAMCINOLONE ACETONIDE 40 MG/ML
40 INJECTION, SUSPENSION INTRA-ARTICULAR; INTRAMUSCULAR PRN
Status: DISCONTINUED | OUTPATIENT
Start: 2025-02-10 | End: 2025-02-14 | Stop reason: HOSPADM

## 2025-02-10 RX ORDER — IOPAMIDOL 408 MG/ML
5 INJECTION, SOLUTION INTRATHECAL
Status: COMPLETED | OUTPATIENT
Start: 2025-02-10 | End: 2025-02-10

## 2025-02-10 RX ORDER — LIDOCAINE HYDROCHLORIDE 10 MG/ML
10 INJECTION, SOLUTION EPIDURAL; INFILTRATION; INTRACAUDAL; PERINEURAL PRN
Status: DISCONTINUED | OUTPATIENT
Start: 2025-02-10 | End: 2025-02-14 | Stop reason: HOSPADM

## 2025-02-10 RX ADMIN — IOPAMIDOL 10 ML: 408 INJECTION, SOLUTION INTRATHECAL at 13:51

## 2025-02-10 RX ADMIN — TRIAMCINOLONE ACETONIDE 40 MG: 40 INJECTION, SUSPENSION INTRA-ARTICULAR; INTRAMUSCULAR at 13:57

## 2025-02-10 RX ADMIN — LIDOCAINE HYDROCHLORIDE 15 ML: 10 INJECTION, SOLUTION EPIDURAL; INFILTRATION; INTRACAUDAL; PERINEURAL at 13:56

## 2025-03-17 NOTE — PROGRESS NOTES
Patient: Ramsey Ortega                MRN: [id number]       SSN: [id number]  YOB: 1965        AGE: 59 y.o.        SEX: male      PCP: Unknown, Provider, BALJINDER  03/20/25    Chief Complaint   Patient presents with    Hip Pain     bilateral     HISTORY:  Ramsey Ortega is a 59 y.o. male who is seen for increased bilateral hip pain. He responded to a left IR hip injection on 2/10/25 and right hip IR injection on 1/3/25 but his hip pains have returned. He states IR hip injections are not helping as much as they used to. He states his hip pains are so severe at times that he can barely walk & he has trouble sleeping. He experiences startup hip pain after sitting. He experiences bilateral lateral hip pain.      He also has a history of left knee pain.  He felt sudden left knee pain when he stood up while calling [employer] a couple years ago. He believes his left knee pain is caused by altered gait offloading due to his right hip pain. He has a h/o gout.     He has been focusing on weight loss. He looked into bariatric surgery, but his insurance would not approve the procedure.  He is considering Ozempic especially since he has sleep apnea.     He was previously seen for left elbow pain -- doing well.       Occupation, etc: Mr. Ortega  works as a  for [employer] for [employer] and has an  background. He lives with his wife, 23-year-old son, and 19-year-old daughter in the [place] [facility].  His son works as a manager at [employer] and his daughter graduated from [employer] and now works for [employer].  Mr. Ortega weighs 339 lbs and is 6'5\" tall. He is down from 350 lbs. His PCP prescribed medication to help curb his appetite. He is looking into getting semaglutide injections on his own since he is not diabetic. His insurance will not approve gastric bypass surgery. His hip and knee pains make it hard to exercise.

## 2025-03-20 ENCOUNTER — OFFICE VISIT (OUTPATIENT)
Age: 60
End: 2025-03-20

## 2025-03-20 VITALS — TEMPERATURE: 97.3 F | WEIGHT: 315 LBS | BODY MASS INDEX: 37.19 KG/M2 | HEIGHT: 77 IN

## 2025-03-20 DIAGNOSIS — M25.551 RIGHT HIP PAIN: ICD-10-CM

## 2025-03-20 DIAGNOSIS — M25.552 HIP PAIN, LEFT: Primary | ICD-10-CM

## 2025-03-20 DIAGNOSIS — M87.00 AVN (AVASCULAR NECROSIS OF BONE) (HCC): ICD-10-CM

## 2025-03-20 DIAGNOSIS — M70.61 TROCHANTERIC BURSITIS, RIGHT HIP: ICD-10-CM

## 2025-03-20 DIAGNOSIS — M25.859 FEMORAL ACETABULAR IMPINGEMENT: ICD-10-CM

## 2025-03-20 DIAGNOSIS — M16.11 UNILATERAL PRIMARY OSTEOARTHRITIS, RIGHT HIP: ICD-10-CM

## 2025-03-20 DIAGNOSIS — M70.62 TROCHANTERIC BURSITIS, LEFT HIP: ICD-10-CM

## 2025-03-20 DIAGNOSIS — M16.12 UNILATERAL PRIMARY OSTEOARTHRITIS, LEFT HIP: ICD-10-CM

## 2025-03-20 RX ORDER — BETAMETHASONE SODIUM PHOSPHATE AND BETAMETHASONE ACETATE 3; 3 MG/ML; MG/ML
3 INJECTION, SUSPENSION INTRA-ARTICULAR; INTRALESIONAL; INTRAMUSCULAR; SOFT TISSUE ONCE
Status: COMPLETED | OUTPATIENT
Start: 2025-03-20 | End: 2025-03-20

## 2025-03-20 RX ORDER — BUPIVACAINE HYDROCHLORIDE 5 MG/ML
4 INJECTION, SOLUTION PERINEURAL ONCE
Status: COMPLETED | OUTPATIENT
Start: 2025-03-20 | End: 2025-03-20

## 2025-03-20 RX ADMIN — BETAMETHASONE SODIUM PHOSPHATE AND BETAMETHASONE ACETATE 3 MG: 3; 3 INJECTION, SUSPENSION INTRA-ARTICULAR; INTRALESIONAL; INTRAMUSCULAR; SOFT TISSUE at 16:06

## 2025-03-20 RX ADMIN — BETAMETHASONE SODIUM PHOSPHATE AND BETAMETHASONE ACETATE 3 MG: 3; 3 INJECTION, SUSPENSION INTRA-ARTICULAR; INTRALESIONAL; INTRAMUSCULAR; SOFT TISSUE at 16:05

## 2025-03-20 RX ADMIN — BUPIVACAINE HYDROCHLORIDE 20 MG: 5 INJECTION, SOLUTION PERINEURAL at 16:07

## 2025-04-10 ENCOUNTER — APPOINTMENT (OUTPATIENT)
Facility: HOSPITAL | Age: 60
End: 2025-04-10
Payer: COMMERCIAL

## 2025-04-10 ENCOUNTER — HOSPITAL ENCOUNTER (EMERGENCY)
Facility: HOSPITAL | Age: 60
Discharge: HOME | End: 2025-04-10
Attending: EMERGENCY MEDICINE
Payer: COMMERCIAL

## 2025-04-10 ENCOUNTER — OFFICE VISIT (OUTPATIENT)
Age: 60
End: 2025-04-10
Payer: COMMERCIAL

## 2025-04-10 VITALS
TEMPERATURE: 97.7 F | WEIGHT: 315 LBS | HEART RATE: 52 BPM | DIASTOLIC BLOOD PRESSURE: 101 MMHG | HEIGHT: 77 IN | SYSTOLIC BLOOD PRESSURE: 150 MMHG | RESPIRATION RATE: 20 BRPM | OXYGEN SATURATION: 99 % | BODY MASS INDEX: 37.19 KG/M2

## 2025-04-10 DIAGNOSIS — M25.552 LEFT HIP PAIN: Primary | ICD-10-CM

## 2025-04-10 DIAGNOSIS — M16.0 PRIMARY OSTEOARTHRITIS OF BOTH HIPS: ICD-10-CM

## 2025-04-10 DIAGNOSIS — M16.11 UNILATERAL PRIMARY OSTEOARTHRITIS, RIGHT HIP: ICD-10-CM

## 2025-04-10 DIAGNOSIS — M16.12 UNILATERAL PRIMARY OSTEOARTHRITIS, LEFT HIP: ICD-10-CM

## 2025-04-10 DIAGNOSIS — M25.551 RIGHT HIP PAIN: Primary | ICD-10-CM

## 2025-04-10 DIAGNOSIS — M25.551 RIGHT HIP PAIN: ICD-10-CM

## 2025-04-10 PROCEDURE — 99213 OFFICE O/P EST LOW 20 MIN: CPT | Performed by: SPECIALIST

## 2025-04-10 PROCEDURE — 6370000000 HC RX 637 (ALT 250 FOR IP): Performed by: EMERGENCY MEDICINE

## 2025-04-10 PROCEDURE — 6360000002 HC RX W HCPCS: Performed by: EMERGENCY MEDICINE

## 2025-04-10 PROCEDURE — 73502 X-RAY EXAM HIP UNI 2-3 VIEWS: CPT

## 2025-04-10 PROCEDURE — 96372 THER/PROPH/DIAG INJ SC/IM: CPT

## 2025-04-10 PROCEDURE — 99284 EMERGENCY DEPT VISIT MOD MDM: CPT

## 2025-04-10 RX ORDER — LIDOCAINE 4 G/G
1 PATCH TOPICAL
Status: DISCONTINUED | OUTPATIENT
Start: 2025-04-10 | End: 2025-04-10 | Stop reason: HOSPADM

## 2025-04-10 RX ORDER — KETOROLAC TROMETHAMINE 15 MG/ML
15 INJECTION, SOLUTION INTRAMUSCULAR; INTRAVENOUS
Status: COMPLETED | OUTPATIENT
Start: 2025-04-10 | End: 2025-04-10

## 2025-04-10 RX ADMIN — KETOROLAC TROMETHAMINE 15 MG: 15 INJECTION, SOLUTION INTRAMUSCULAR; INTRAVENOUS at 05:50

## 2025-04-10 ASSESSMENT — PAIN DESCRIPTION - ORIENTATION
ORIENTATION: RIGHT
ORIENTATION: RIGHT

## 2025-04-10 ASSESSMENT — PAIN DESCRIPTION - LOCATION
LOCATION: HIP
LOCATION: HIP

## 2025-04-10 ASSESSMENT — PAIN SCALES - GENERAL
PAINLEVEL_OUTOF10: 10
PAINLEVEL_OUTOF10: 7

## 2025-04-10 ASSESSMENT — PAIN DESCRIPTION - PAIN TYPE: TYPE: ACUTE PAIN;CHRONIC PAIN

## 2025-04-10 ASSESSMENT — PAIN - FUNCTIONAL ASSESSMENT: PAIN_FUNCTIONAL_ASSESSMENT: 0-10

## 2025-04-10 ASSESSMENT — PAIN DESCRIPTION - DESCRIPTORS: DESCRIPTORS: ACHING

## 2025-04-10 NOTE — PROGRESS NOTES
Patient: Ramsey Ortega                MRN: [id number]       SSN: [id number]  YOB: 1965        AGE: 59 y.o.        SEX: male      PCP: Unknown, Provider  04/10/25    Chief Complaint   Patient presents with    Hip Pain     right     HISTORY:  Ramsey Ortega is a 59 y.o. male who is seen for increased bilateral hip pain. His right hip bothers him more than his left. His pain was so severe he went to [facility] ED this morning. X-rays from [facility] ED today revealed several bilateral hip OA. He responded to his bilateral hip injections last ov but his pains have returned. He responded to a left IR hip injection on 2/10/25 and right hip IR injection on 1/3/25. He states IR hip injections are not helping as much as they used to. He states his hip pains are so severe at times that he can barely walk & he has trouble sleeping. He experiences startup hip pain after sitting. He experiences bilateral lateral hip pain. His PCP provided him an Rx for 500 mg Aleve that did not help.      He also has a history of left knee pain.  He felt sudden left knee pain when he stood up while calling [employer] a couple years ago. He believes his left knee pain is caused by altered gait offloading due to his right hip pain. He has a h/o gout.     He has been focusing on weight loss. He looked into bariatric surgery, but his insurance would not approve the procedure.      He was previously seen for left elbow pain -- doing well.       Occupation, etc: Mr. Ortega  works as a  for [employer] for [employer] and has an  background. He lives with his wife, 23-year-old son, and 19-year-old daughter in the [facility].  His son works as a manager at [employer] and his daughter graduated from [employer] and now works for [employer].  Mr. Ortega weighs 340 lbs and is 6'5\" tall. He is down from 350 lbs. His PCP prescribed medication to help curb his

## 2025-04-10 NOTE — ED TRIAGE NOTES
Pt arrived ambulatory to triage for c/o hip pain.     Per pt, pt has had hip pain for years and has been told he needs a hip replacement.     Pt has been getting cortisone shots and other therapies including have some pull each leg. The last time someone pulled his leg, his right hip immediately felt pain and has not gotten better.     Pt states pain is 10/10.     Pt denies other complaints at this time. Pt is A&O X 4.

## 2025-04-10 NOTE — ED PROVIDER NOTES
EMERGENCY DEPARTMENT HISTORY AND PHYSICAL EXAM      Date: 4/10/2025  Patient Name: Ramsey Ortega    History of Presenting Illness     Chief Complaint   Patient presents with    Hip Pain       History (Context): Ramsey Ortega is a 59 y.o. male  presents to the ED today with chief complaint of right hip pain    59-year-old male with a history of arthritis in his hips. He has been seeing Dr. Loera at [facility] and has been told he needs surgery but must lose 100 lbs first. For the past three years, he has been receiving hip injections, initially mostly in the left hip but now in both. Yesterday morning at work, he had a coworker pull his leg to ease the pain, which is a technique he has used before. After the right leg was pulled, he has experienced constant pain throughout his leg. The patient also has a history of high blood pressure.        PCP: Unknown, Provider    Current Facility-Administered Medications   Medication Dose Route Frequency Provider Last Rate Last Admin    ketorolac (TORADOL) injection 15 mg  15 mg IntraMUSCular NOW Benigno Jung MD        lidocaine 4 % external patch 1 patch  1 patch TransDERmal NOW Benigno Jung MD         Current Outpatient Medications   Medication Sig Dispense Refill    naproxen (NAPROSYN) 500 MG tablet Take 1 tablet by mouth 2 times daily (with meals) 60 tablet 1    spironolactone (ALDACTONE) 25 MG tablet Take 1 tablet by mouth 2 times daily      sildenafil (VIAGRA) 50 MG tablet Take 1 tablet by mouth as needed for Erectile Dysfunction prn      cyclobenzaprine (FLEXERIL) 10 MG tablet Take 1 tablet by mouth 3 times daily as needed for Muscle spasms 30 tablet 0    ibuprofen (ADVIL;MOTRIN) 800 MG tablet Take 1 tablet by mouth 3 times daily (with meals) 30 tablet 0    diclofenac sodium (VOLTAREN) 1 % GEL Apply 2 g topically 4 times daily 100 g 4    indomethacin (INDOCIN) 50 MG capsule       metoprolol succinate (TOPROL XL) 100 MG extended

## 2025-04-21 ENCOUNTER — OFFICE VISIT (OUTPATIENT)
Age: 60
End: 2025-04-21
Payer: COMMERCIAL

## 2025-04-21 VITALS — HEIGHT: 77 IN | BODY MASS INDEX: 37.19 KG/M2 | WEIGHT: 315 LBS

## 2025-04-21 DIAGNOSIS — M16.11 PRIMARY OSTEOARTHRITIS OF RIGHT HIP: Primary | ICD-10-CM

## 2025-04-21 DIAGNOSIS — M16.12 PRIMARY OSTEOARTHRITIS OF LEFT HIP: ICD-10-CM

## 2025-04-21 DIAGNOSIS — E66.01 MORBID OBESITY (HCC): ICD-10-CM

## 2025-04-21 PROCEDURE — 99214 OFFICE O/P EST MOD 30 MIN: CPT | Performed by: ORTHOPAEDIC SURGERY

## 2025-04-21 NOTE — PROGRESS NOTES
incision clean and dry until it is fully healed.  He understands the risks associated with each approach as well as the benefits and would like to move forward with a direct anterior approach.          4/10/2025     4:25 PM   AMB PAIN ASSESSMENT   Location of Pain Hip   Location Modifiers Right   Severity of Pain 8   Quality of Pain Aching       Past Medical History:   Diagnosis Date    EBONY (acute kidney injury)     Atypical chest pain     Hypertension     Hypokalemia     Obesity (BMI 30.0-34.9) 4/24/2017    Renal cell carcinoma     Renal cell carcinoma of right kidney 12/23/14    Renal mass, right     Right hip pain     Groin       Family History   Problem Relation Age of Onset    Hypertension Mother     Diabetes Maternal Grandmother     Cancer Maternal Grandfather         Prostate       Current Outpatient Medications   Medication Sig Dispense Refill    naproxen (NAPROSYN) 500 MG tablet Take 1 tablet by mouth 2 times daily (with meals) 60 tablet 1    spironolactone (ALDACTONE) 25 MG tablet Take 1 tablet by mouth 2 times daily      sildenafil (VIAGRA) 50 MG tablet Take 1 tablet by mouth as needed for Erectile Dysfunction prn      cyclobenzaprine (FLEXERIL) 10 MG tablet Take 1 tablet by mouth 3 times daily as needed for Muscle spasms 30 tablet 0    ibuprofen (ADVIL;MOTRIN) 800 MG tablet Take 1 tablet by mouth 3 times daily (with meals) 30 tablet 0    diclofenac sodium (VOLTAREN) 1 % GEL Apply 2 g topically 4 times daily 100 g 4    indomethacin (INDOCIN) 50 MG capsule       metoprolol succinate (TOPROL XL) 100 MG extended release tablet Take 1 tablet by mouth daily      amLODIPine (NORVASC) 10 MG tablet Take by mouth daily      aspirin 81 MG chewable tablet Take 1 tablet by mouth daily       No current facility-administered medications for this visit.        Past Surgical History:   Procedure Laterality Date    KIDNEY REMOVAL Right 12/23/14    Open Partial, Dr. Smith, [facility]    KIDNEY REMOVAL Right 12/23/2014

## 2025-04-21 NOTE — PATIENT INSTRUCTIONS
Dr. Garza Patient Information for Arthroplasty    [employer] Association of Hip [employer] Knee [employer] (AAHKS) patient information website:  [email or web address]    [place] Patient information website:  [email or web address]    Implant information:  Knee:  Partial medial or lateral replacement: Marcelino Reformis  Patellofemoral replacement: Arthrex iBalance or [place]openo Reformis  Total knee arthroplasty: Marcelino Triathlon  Hip:   Femoral component: Marcelino Insignia or Accolade II  Acetabular component: Marcelino Trident II Tritanium  Robot  JUAN robotic arm with CT preoperative planning      Timeline:  6 weeks prior to surgery  Get preoperative labs (bloodwork, urinalysis, Chest Xray, EKG)  Quit all nicotine products at least 4 weeks prior to surgery.   Set up family or friends to be available for help after surgery.  Plan on going home the day of surgery    Within 30 days of procedure  Obtain clearances  Primary care  +/- cardiology  +/- dental  +/- others as doctor sees needed  If any pending dental procedures other than cleaning, it must be completed 3 months prior to joint replacement  No major elective dental procedures within 3 months after joint replacement surgery  Clarify stop date for blood thinner medication  Clarify stop date for medications used to treat autoimmune disorders such as Rheumatoid, Psoriasis, Lupus, HIV, etc.     1-2 weeks prior to surgery  Pre-operative visit with Surgical team   Answer any remaining questions  Ensure labs and clearances are COMPLETED PRIOR to pre-operative appointment  Ensure preoperative imaging is completed  CT scan for JUAN planning  Lumbar spine Xray for hip planning  Ensure no lesions or skin flare-ups at surgical site  Ensure stop date for anti-rheumatic or blood thinning medications  DMARDs, NSAIDs, etc.    Night prior to surgery  Stay hydrated  Eat a good meal  Nothing to eat after midnight  Bath/Shower with Chlorhexidine wash  Receive call from OR to give arrival time for

## 2025-04-29 ENCOUNTER — CLINICAL DOCUMENTATION (OUTPATIENT)
Age: 60
End: 2025-04-29

## 2025-04-29 DIAGNOSIS — Z01.818 PREOPERATIVE TESTING: ICD-10-CM

## 2025-04-29 DIAGNOSIS — Z01.811 PRE-OP CHEST EXAM: ICD-10-CM

## 2025-04-29 DIAGNOSIS — M21.951 ACQUIRED DEFORMITY OF RIGHT HIP: ICD-10-CM

## 2025-04-29 DIAGNOSIS — M16.11 PRIMARY OSTEOARTHRITIS OF RIGHT HIP: ICD-10-CM

## 2025-04-29 DIAGNOSIS — Z01.810 PREOP CARDIOVASCULAR EXAM: ICD-10-CM

## 2025-04-29 DIAGNOSIS — M16.11 PRIMARY OSTEOARTHRITIS OF RIGHT HIP: Primary | ICD-10-CM

## 2025-04-29 NOTE — PROGRESS NOTES
Patient dropped off FMLA paperwork at  office to be completed and can be further advised when ready for  at [phone].

## 2025-04-29 NOTE — PROGRESS NOTES
1. Primary osteoarthritis of right hip  -     EKG 12 Lead; Future  -     XR CHEST (2 VW); Future  -     Urinalysis; Future  -     Protime-INR; Future  -     Hemoglobin A1C; Future  -     Comprehensive Metabolic Panel; Future  -     CBC with Auto Differential; Future  -     APTT; Future  -     CT HIP RIGHT WO CONTRAST; Future  -     Nicotine Metabolites, Urine; Future  -     Urine Drug Screen; Future  2. Acquired deformity of right hip  -     CT HIP RIGHT WO CONTRAST; Future  3. Preoperative testing  -     Urinalysis; Future  -     Protime-INR; Future  -     Hemoglobin A1C; Future  -     Comprehensive Metabolic Panel; Future  -     CBC with Auto Differential; Future  -     APTT; Future  -     CT HIP RIGHT WO CONTRAST; Future  -     Nicotine Metabolites, Urine; Future  -     Urine Drug Screen; Future  4. Preop cardiovascular exam  -     EKG 12 Lead; Future  5. Pre-op chest exam  -     XR CHEST (2 VW); Future

## 2025-04-30 ENCOUNTER — CLINICAL DOCUMENTATION (OUTPATIENT)
Age: 60
End: 2025-04-30

## 2025-05-19 ENCOUNTER — HOSPITAL ENCOUNTER (OUTPATIENT)
Facility: HOSPITAL | Age: 60
Discharge: HOME | End: 2025-05-22
Payer: COMMERCIAL

## 2025-05-19 ENCOUNTER — HOSPITAL ENCOUNTER (OUTPATIENT)
Facility: HOSPITAL | Age: 60
Setting detail: SPECIMEN
Discharge: HOME | End: 2025-05-22

## 2025-05-19 DIAGNOSIS — M16.11 PRIMARY OSTEOARTHRITIS OF RIGHT HIP: ICD-10-CM

## 2025-05-19 DIAGNOSIS — Z01.811 PRE-OP CHEST EXAM: ICD-10-CM

## 2025-05-19 DIAGNOSIS — Z01.818 PREOPERATIVE TESTING: ICD-10-CM

## 2025-05-19 DIAGNOSIS — Z01.810 PREOP CARDIOVASCULAR EXAM: ICD-10-CM

## 2025-05-19 DIAGNOSIS — M21.951 ACQUIRED DEFORMITY OF RIGHT HIP: ICD-10-CM

## 2025-05-19 LAB — SENTARA SPECIMEN COLLECTION: NORMAL

## 2025-05-19 PROCEDURE — 71046 X-RAY EXAM CHEST 2 VIEWS: CPT

## 2025-05-19 PROCEDURE — 93005 ELECTROCARDIOGRAM TRACING: CPT

## 2025-05-19 PROCEDURE — 73700 CT LOWER EXTREMITY W/O DYE: CPT

## 2025-05-20 DIAGNOSIS — R82.90 ABNORMAL FINDING ON URINALYSIS: Primary | ICD-10-CM

## 2025-05-20 LAB
A/G RATIO: 1.4 RATIO (ref 1.1–2.6)
ALBUMIN: 4.4 G/DL (ref 3.5–5)
ALP BLD-CCNC: 82 U/L (ref 25–115)
ALT SERPL-CCNC: 14 U/L (ref 5–40)
ANION GAP SERPL CALCULATED.3IONS-SCNC: 12 MMOL/L (ref 3–15)
APTT: 27 SEC (ref 22–36)
AST SERPL-CCNC: 20 U/L (ref 10–37)
BACTERIA, URINE: NEGATIVE
BASOPHILS # BLD: 1 % (ref 0–2)
BASOPHILS ABSOLUTE: 0 K/UL (ref 0–0.2)
BILIRUB SERPL-MCNC: 0.4 MG/DL (ref 0.2–1.2)
BILIRUBIN, URINE: NEGATIVE
BUN BLDV-MCNC: 17 MG/DL (ref 6–22)
CALCIUM SERPL-MCNC: 9 MG/DL (ref 8.4–10.5)
CANNABINOIDS: ABNORMAL
CHLORIDE BLD-SCNC: 104 MMOL/L (ref 98–110)
CLARITY, UA: ABNORMAL
CO2: 24 MMOL/L (ref 20–32)
COCAINE: ABNORMAL
COLOR, UA: ABNORMAL
CREAT SERPL-MCNC: 1 MG/DL (ref 0.5–1.2)
EKG ATRIAL RATE: 59 BPM
EKG DIAGNOSIS: NORMAL
EKG P AXIS: 0 DEGREES
EKG P-R INTERVAL: 166 MS
EKG Q-T INTERVAL: 432 MS
EKG QRS DURATION: 80 MS
EKG QTC CALCULATION (BAZETT): 427 MS
EKG R AXIS: 7 DEGREES
EKG T AXIS: 108 DEGREES
EKG VENTRICULAR RATE: 59 BPM
EOSINOPHIL # BLD: 3 % (ref 0–6)
EOSINOPHILS ABSOLUTE: 0.1 K/UL (ref 0–0.5)
ESTIMATED AVERAGE GLUCOSE: 124 MG/DL (ref 91–123)
GFR, ESTIMATED: 82.8 ML/MIN/1.73 SQ.M.
GLOBULIN: 3.1 G/DL (ref 2–4)
GLUCOSE URINE: NEGATIVE MG/DL
GLUCOSE: 94 MG/DL (ref 70–99)
HBA1C MFR BLD: 5.9 % (ref 4.8–5.6)
HCT VFR BLD CALC: 45.1 % (ref 39.3–51.6)
HEMOGLOBIN: 14.4 G/DL (ref 13.1–17.2)
HYALINE CASTS: ABNORMAL /LPF (ref 0–2)
INR BLD: 1.03 (ref 0.93–1.29)
KETONES, URINE: ABNORMAL MG/DL
LEUKOCYTE ESTERASE, URINE: ABNORMAL
LYMPHOCYTES # BLD: 28 % (ref 20–45)
LYMPHOCYTES ABSOLUTE: 1.5 K/UL (ref 1–4.8)
MCH RBC QN AUTO: 29 PG (ref 26–34)
MCHC RBC AUTO-ENTMCNC: 32 G/DL (ref 31–36)
MCV RBC AUTO: 90 FL (ref 80–95)
MONOCYTES ABSOLUTE: 0.4 K/UL (ref 0.1–1)
MONOCYTES: 8 % (ref 3–12)
NEUTROPHILS ABSOLUTE: 3.1 K/UL (ref 1.8–7.7)
NEUTROPHILS SEGMENTED: 60 % (ref 40–75)
NITRITE, URINE: NEGATIVE
OCCULT BLOOD,URINE: NEGATIVE
PDW BLD-RTO: 13.1 % (ref 10–15.5)
PH, URINE: 5.5 PH (ref 5–8)
PLATELET # BLD: 226 K/UL (ref 140–440)
PMV BLD AUTO: 12.3 FL (ref 9–13)
POTASSIUM SERPL-SCNC: 4.4 MMOL/L (ref 3.5–5.5)
PROTEIN, URINE: NEGATIVE MG/DL
PROTHROMBIN TIME: 11 SEC (ref 9–13)
RBC # BLD: 5 M/UL (ref 3.8–5.8)
RBC URINE: ABNORMAL /HPF
SODIUM BLD-SCNC: 140 MMOL/L (ref 133–145)
SPECIFIC GRAVITY UA: 1.03 (ref 1–1.03)
SPECIFIC GRAVITY UA: 5.9 (ref 4.5–8)
SPECIFIC GRAVITY UA: >1.03 (ref 1–1.03)
SQUAMOUS EPITHELIAL CELLS: ABNORMAL /HPF
TOTAL PROTEIN: 7.5 G/DL (ref 6.4–8.3)
UROBILINOGEN, URINE: 0.2 MG/DL
WBC # BLD: 5.1 K/UL (ref 4–11)
WBC URINE: ABNORMAL /HPF (ref 0–2)

## 2025-05-20 PROCEDURE — 93010 ELECTROCARDIOGRAM REPORT: CPT | Performed by: INTERNAL MEDICINE

## 2025-05-20 RX ORDER — CEPHALEXIN 500 MG/1
500 CAPSULE ORAL 4 TIMES DAILY
Qty: 20 CAPSULE | Refills: 0 | Status: SHIPPED | OUTPATIENT
Start: 2025-05-20 | End: 2025-05-25

## 2025-05-21 LAB
COTININE, URINE: 4 NG/ML
NICOTINE URINE: 3 NG/ML

## 2025-05-28 ENCOUNTER — PREP FOR PROCEDURE (OUTPATIENT)
Age: 60
End: 2025-05-28

## 2025-05-28 DIAGNOSIS — M16.11 PRIMARY OSTEOARTHRITIS OF RIGHT HIP: ICD-10-CM

## 2025-06-16 ENCOUNTER — OFFICE VISIT (OUTPATIENT)
Age: 60
End: 2025-06-16
Payer: COMMERCIAL

## 2025-06-16 VITALS
WEIGHT: 315 LBS | SYSTOLIC BLOOD PRESSURE: 142 MMHG | DIASTOLIC BLOOD PRESSURE: 84 MMHG | HEIGHT: 77 IN | BODY MASS INDEX: 37.19 KG/M2

## 2025-06-16 DIAGNOSIS — M16.11 PRIMARY OSTEOARTHRITIS OF RIGHT HIP: ICD-10-CM

## 2025-06-16 DIAGNOSIS — Z01.818 PREOP EXAMINATION: Primary | ICD-10-CM

## 2025-06-16 PROCEDURE — 99024 POSTOP FOLLOW-UP VISIT: CPT

## 2025-06-16 PROCEDURE — 73502 X-RAY EXAM HIP UNI 2-3 VIEWS: CPT

## 2025-06-16 PROCEDURE — 72100 X-RAY EXAM L-S SPINE 2/3 VWS: CPT

## 2025-06-16 RX ORDER — TRAMADOL HYDROCHLORIDE 50 MG/1
50 TABLET ORAL EVERY 6 HOURS PRN
Qty: 28 TABLET | Refills: 0 | Status: SHIPPED | OUTPATIENT
Start: 2025-06-16 | End: 2025-06-23

## 2025-06-16 RX ORDER — ONDANSETRON 8 MG/1
8 TABLET, ORALLY DISINTEGRATING ORAL EVERY 8 HOURS PRN
Qty: 10 TABLET | Refills: 0 | Status: SHIPPED | OUTPATIENT
Start: 2025-06-16

## 2025-06-16 RX ORDER — DOCUSATE SODIUM 100 MG/1
100 CAPSULE, LIQUID FILLED ORAL 2 TIMES DAILY
Qty: 60 CAPSULE | Refills: 0 | Status: SHIPPED | OUTPATIENT
Start: 2025-06-16 | End: 2025-07-16

## 2025-06-16 RX ORDER — PANTOPRAZOLE SODIUM 40 MG/1
40 TABLET, DELAYED RELEASE ORAL DAILY
Qty: 30 TABLET | Refills: 0 | Status: SHIPPED | OUTPATIENT
Start: 2025-06-16 | End: 2025-07-16

## 2025-06-16 RX ORDER — ASPIRIN 81 MG/1
81 TABLET, CHEWABLE ORAL 2 TIMES DAILY
Qty: 60 TABLET | Refills: 0 | Status: SHIPPED | OUTPATIENT
Start: 2025-06-16 | End: 2025-07-16

## 2025-06-16 RX ORDER — MELOXICAM 15 MG/1
15 TABLET ORAL DAILY
Qty: 30 TABLET | Refills: 1 | Status: SHIPPED | OUTPATIENT
Start: 2025-06-16 | End: 2025-08-15

## 2025-06-16 RX ORDER — OXYCODONE HYDROCHLORIDE 5 MG/1
5 TABLET ORAL EVERY 6 HOURS PRN
Qty: 10 TABLET | Refills: 0 | Status: SHIPPED | OUTPATIENT
Start: 2025-06-16 | End: 2025-06-23

## 2025-06-16 RX ORDER — ACETAMINOPHEN 500 MG
1000 TABLET ORAL EVERY 8 HOURS
Qty: 180 TABLET | Refills: 0 | Status: SHIPPED | OUTPATIENT
Start: 2025-06-16 | End: 2025-07-16

## 2025-06-16 NOTE — PATIENT INSTRUCTIONS
PRE-OP AND POST OP INSTRUCTIONS:  No food or drink after midnight the day of surgery.  You will be given a walker at the hospital the day of surgery. If you already have a walker (We request the walker only have 2 wheels. Not a 4 wheeled rolling walker) please bring this with you the day of surgery.    PRE-OPERATIVE SOAP:  Use either provided chlorhexidine soap or over the counter bar of antimicrobial gold dial soap starting 2 days before surgery. Do a full body shower, avoiding the face and privates, with the soap. This will be 3 showers in total:  2 days before  1 day before  Morning of surgery    PRE-OPERATIVE GUIDANCE FOR HOME MEDICATIONS:  Stop all anti-inflammatories such as: ibuprofen (advil), naproxen (aleve), aspirin, goody's powder, BC powder, voltaren (diclofenac) a week before surgery.  Take your blood pressure medications the morning of surgery.       POST OP MEDICATIONS:  I will send the medications listed below to your requested pharmacy after your appointment today.   These medications are for POST OP PAIN. DO NOT TAKE THESE MEDICATIONS PRIOR TO SURGERY. YOU WILL NOT BE GIVEN REFILLS POST OP IF YOU DO THIS.  PAIN  First line Meloxicam   Tylenol   Second line Tramadol   Emergency break through pain Oxycodone (for emergency breakthrough pain not controlled by the first 3 medications) (wait at least 1 hour between tramadol and oxycodone)     GI SYMPTOMS  Constipation  Colace    Nausea Zofran   Prevention Protonix (take daily while taking pain medications)     BLOOD CLOT PREVENTION  Aspirin Take 2 times a day for 30 days. To be started THE DAY AFTER surgery.        POST OP REHAB:  Knee replacement/ knee scope patients: You will not start formal physical therapy until 2 weeks post op. During this time, we would like you to do the exercises listed below to begin working on quad strength and ROM.   Hip replacement patients: We do not typically order formal physical therapy for hip replacements. We encourage

## 2025-06-16 NOTE — PROGRESS NOTES
(prior to 6/17/2023), [facility]  (prior to 6/17/2023)    Exercise Vital Sign     Days of Exercise per Week: 0 days     Minutes of Exercise per Session: Not on file   Stress: Not on file   Social Connections: Not on file   Intimate Partner Violence: Not At Risk (2/10/2022)    Humiliation, Afraid, Rape, and Kick questionnaire     Fear of Current or Ex-Partner: No     Emotionally Abused: No     Physically Abused: No     Sexually Abused: No   Housing Stability: Not on file         [unfilled]      Prescription medication management discussed with patient.     Electronically signed by: Ashtyn Pan PA-C    Note: This note was completed using voice recognition software.  Any typographical/name errors or mistakes are unintentional.     The patient (or guardian, if applicable) and other individuals in attendance with the patient were advised that Artificial Intelligence will be utilized during this visit to record, process the conversation to generate a clinical note, and support improvement of the AI technology. The patient (or guardian, if applicable) and other individuals in attendance at the appointment consented to the use of AI, including the recording.

## 2025-06-16 NOTE — ASSESSMENT & PLAN NOTE
Planned Surgery Right total hip arthroplasty, DA, with JUAN   Surgery date 6/25/25   Clearance obtained No, needs PCP clearance. Labs within acceptable limits. UTI on preop UA, keflex given and completed. Repeat UA to be done at joints class on 6/23/25.       PMH Hypertension  KATINA  Hx of renal cancer (normal kidney function)  Obesity BMI 39   Allergies  Allergies   Allergen Reactions    Benazepril Anaphylaxis    Lisinopril Anaphylaxis    Gadolinium Derivatives Itching     Other reaction(s): Unknown (comments)  Pt had itching, nausea, perfuse sweating, lightheadedness----taken to ed for evaluation       Labs Lab Results   Component Value Date    WBC 5.1 05/19/2025    HGB 14.4 05/19/2025    HCT 45.1 05/19/2025    MCV 90 05/19/2025     05/19/2025    LYMPHOPCT 39 05/23/2021    RBC 5.00 05/19/2025    MCH 29 05/19/2025    MCHC 32 05/19/2025    RDW 13.1 05/19/2025     Hemoglobin A1C   Date Value Ref Range Status   05/19/2025 5.9 (H) 4.8 - 5.6 % Final   ,  Lab Results   Component Value Date     05/19/2025    K 4.4 05/19/2025     05/19/2025    CO2 24 05/19/2025    BUN 17 05/19/2025    CREATININE 1.0 05/19/2025    GLUCOSE 94 05/19/2025    CALCIUM 9.0 05/19/2025    BILITOT 0.4 05/19/2025    ALKPHOS 82 05/19/2025    AST 20 05/19/2025    ALT 14 05/19/2025    LABGLOM 82.8 05/19/2025    GFRAA >60 05/23/2021    AGRATIO 1.4 05/19/2025    GLOB 3.1 05/19/2025      No results found for: \"VITD25\"   Lab Results   Component Value Date    INR 1.03 05/19/2025    PROTIME 11.0 05/19/2025     Lab Results   Component Value Date    APTT 27 05/19/2025      Pharm [facility] [address]   Sent? yes   Anesthesia  Anesthesia was also discussed with them today including spinal anesthesia vs general anesthesia. The risks and benefits were explained for both including the risks of nausea, vomiting, confusion, sore throat, back pain (with spinal anesthesia) and allergic reaction. The risk of cardiac and pulmonary complication with general

## 2025-06-16 NOTE — H&P (VIEW-ONLY)
Patient: Ramsey Ortega                MRN: [id number]       SSN: [id number]  YOB: 1965        AGE: 60 y.o.        SEX: male  BMI: Body mass index is 39.84 kg/m².    PCP: Haresh Storey MD  06/16/25    Chief Complaint: Pre-op Exam (Right ABELINO 6/25/25)      1. Preop examination  -     AMB POC XRAY, SPINE, LUMBOSACRAL; 2 O  -     AMB POC X-RAY RADEX HIP UNI WITH PELVIS 2-3 VIEWS  -     Urinalysis with Microscopic; Future  2. Primary osteoarthritis of right hip  Assessment & Plan:  Planned Surgery Right total hip arthroplasty, DA, with JUAN   Surgery date 6/25/25   Clearance obtained No, needs PCP clearance. Labs within acceptable limits. UTI on preop UA, keflex given and completed. Repeat UA to be done at joints class on 6/23/25.       PMH Hypertension  KATINA  Hx of renal cancer (normal kidney function)  Obesity BMI 39   Allergies  Allergies   Allergen Reactions    Benazepril Anaphylaxis    Lisinopril Anaphylaxis    Gadolinium Derivatives Itching     Other reaction(s): Unknown (comments)  Pt had itching, nausea, perfuse sweating, lightheadedness----taken to ed for evaluation       Labs Lab Results   Component Value Date    WBC 5.1 05/19/2025    HGB 14.4 05/19/2025    HCT 45.1 05/19/2025    MCV 90 05/19/2025     05/19/2025    LYMPHOPCT 39 05/23/2021    RBC 5.00 05/19/2025    MCH 29 05/19/2025    MCHC 32 05/19/2025    RDW 13.1 05/19/2025     Hemoglobin A1C   Date Value Ref Range Status   05/19/2025 5.9 (H) 4.8 - 5.6 % Final   ,  Lab Results   Component Value Date     05/19/2025    K 4.4 05/19/2025     05/19/2025    CO2 24 05/19/2025    BUN 17 05/19/2025    CREATININE 1.0 05/19/2025    GLUCOSE 94 05/19/2025    CALCIUM 9.0 05/19/2025    BILITOT 0.4 05/19/2025    ALKPHOS 82 05/19/2025    AST 20 05/19/2025    ALT 14 05/19/2025    LABGLOM 82.8 05/19/2025    GFRAA >60 05/23/2021    AGRATIO 1.4 05/19/2025    GLOB 3.1 05/19/2025      No results found for: \"VITD25\"   Lab

## 2025-06-20 ENCOUNTER — TELEPHONE (OUTPATIENT)
Age: 60
End: 2025-06-20

## 2025-06-20 ENCOUNTER — HOSPITAL ENCOUNTER (OUTPATIENT)
Facility: HOSPITAL | Age: 60
Setting detail: SPECIMEN
Discharge: HOME | End: 2025-06-23

## 2025-06-20 LAB
BACTERIA, URINE: NEGATIVE
BILIRUBIN, URINE: NEGATIVE
CLARITY, UA: CLEAR
COLOR, UA: YELLOW
GLUCOSE URINE: NEGATIVE MG/DL
HYALINE CASTS: ABNORMAL /LPF (ref 0–2)
KETONES, URINE: ABNORMAL MG/DL
LEUKOCYTE ESTERASE, URINE: NEGATIVE
NITRITE, URINE: NEGATIVE
OCCULT BLOOD,URINE: NEGATIVE
PH, URINE: 5.5 PH (ref 5–8)
PROTEIN, URINE: NEGATIVE MG/DL
RBC URINE: ABNORMAL /HPF
SENTARA SPECIMEN COLLECTION: NORMAL
SPECIFIC GRAVITY UA: 1.02 (ref 1–1.03)
SQUAMOUS EPITHELIAL CELLS: ABNORMAL /HPF
UROBILINOGEN, URINE: 1 MG/DL
WBC URINE: ABNORMAL /HPF (ref 0–2)

## 2025-06-20 PROCEDURE — 99001 SPECIMEN HANDLING PT-LAB: CPT

## 2025-06-20 NOTE — TELEPHONE ENCOUNTER
Patient called to ask if it would be possible to have  a temporary Handicap Placard while he recovers from surgery.    Please review and advise patient, [phone]

## 2025-06-23 ENCOUNTER — HOSPITAL ENCOUNTER (OUTPATIENT)
Facility: HOSPITAL | Age: 60
Discharge: HOME | End: 2025-06-26

## 2025-06-23 RX ORDER — NALTREXONE HYDROCHLORIDE 50 MG/1
50 TABLET, FILM COATED ORAL DAILY
COMMUNITY
Start: 2025-05-07

## 2025-06-23 RX ORDER — CETIRIZINE HYDROCHLORIDE 10 MG/1
10 TABLET ORAL DAILY PRN
COMMUNITY

## 2025-06-23 RX ORDER — BUPROPION HYDROCHLORIDE 150 MG/1
150 TABLET, EXTENDED RELEASE ORAL 2 TIMES DAILY
COMMUNITY
Start: 2025-06-12

## 2025-06-23 NOTE — DISCHARGE INSTRUCTIONS
Instructions for your surgery at [facility]      Today's Date:  6/23/2025      Patient's Name:  Ramsey Ortega           Surgery Date:  06/25/2025              Please enter the main entrance of the hospital and check-in at the front security desk located in the lobby. They will direct you to the area to report for your surgery.     Do NOT eat or drink anything, including candy, gum, or ice chips after midnight prior to your surgery, unless you have specific instructions from your surgeon or anesthesia provider to do so.  Brush your teeth before coming to the hospital. You may swish with water, but do not swallow.  No smoking/Vaping/E-Cigarettes 24 hours prior to the day of surgery.  No alcohol 24 hours prior to the day of surgery.  No recreational drugs for one week prior to the day of surgery.  Bring Photo ID, Insurance information, and Co-pay if required on day of surgery.  Bring in pertinent legal documents, such as, Medical Power of , DNR, Advance Directive, etc.  Leave all valuables, including money/purse, weapons at home.  Remove all jewelry, including ALL body piercings, nail polish, acrylic nails, and makeup (including mascara); no lotions, powders, deodorant, or perfume/cologne/after shave on the skin.  Follow instruction for Hibiclens washes and CHG wipes from surgeon's office.   Glasses and dentures may be worn to the hospital. They must be removed prior to surgery. Please bring case/container for glasses or dentures.   Contact lenses should not be worn on day of surgery.   Call your doctor's office if symptoms of a cold or illness develop within 24-48 hours prior to your surgery.  Call your doctor's office if you have any questions concerning insurance or co-pays.  15. AN ADULT (relative or friend 18 years or older) MUST DRIVE YOU HOME AFTER YOUR SURGERY.  16. Please make arrangements for a responsible adult (18 years or older) to be with you for 24 hours after  [facility] as scheduled previously. Phone: [phone]

## 2025-06-24 ENCOUNTER — ANESTHESIA EVENT (OUTPATIENT)
Facility: HOSPITAL | Age: 60
End: 2025-06-24
Payer: COMMERCIAL

## 2025-06-24 NOTE — TELEPHONE ENCOUNTER
Spoke to patient and informed patient that Dr. Garza and GWEN Chamorro is in surgery today and tomorrow. Informed patient that he can  the handicapped placard on Thursday at the  or HS location.    He states the HS location on Thursday afternoon.

## 2025-06-24 NOTE — CARE COORDINATION
Patient attended total joint class on 06/23/2025 for total Hip replacement with Dr. Garza  scheduled for 06/27/2025. It was explained in class that this is considered an outpatient elective procedure and that baring medical complications the expectation was for same day discharge. It was explained the importance of a strong support system that must be present the day of surgery and willing to spend the first night home with the patient. Patient identified his spouse  as their support system. Reviewed the importance of smoking cessation, eating healthy, maintaining healthy weight and refraining from any type of recreational drug use. Reviewed the correct way to shower preoperatively using the CHG wash that was provided to the patient. Patient was instructed to sleep in clean sheet the evening prior to surgery. It was explained that if the patient did not already have in their possession a walker with two wheels in the front, that one would be provided to them and adjusted accordingly to their height by physical therapy. Patient was reminded that other items such as bedside commodes and shower chairs are typically not covered by insurance but can be ordered if recommended by occupational therapy. It was suggested that if these were items, if the patient wished to have them, could purchase for a fraction of the cost through [employer], [employer] or even [employer]. Patient was educated regarding both spinal anesthesia and general anesthesia with emphasis on the fact that no patient is awake during surgery. Reviewed preoperative block procedures for both hips and knees that take place in the preoperative area with anesthesia and explained that it is at the a choice between surgeon and patient as to if these blocks are used. Reviewed realistic expectations of pain postoperatively. Patient reminded that pain expectation immediately postop should never be less than what they are prior to surgery. Education provided that  realistic expectation for pain should fall between 5-6. Patient was educated that mobility provides pain relief, as interarticular injections are placed prior to closure and movement helps move the medication throughout the tissues and muscles to assist with pain relief.  Patient instructed regarding the transport process and expectations for discharge. Patient educated that they will be transported from the PACU to the floor via stretcher and that they will be assisted with ambulation from the stretcher to a bedside chair upon arrival to their room. It was emphasized that sitting up and eating prior to working with physical therapy  was important to prevent nausea that accompanies pain medication on an empty stomach ,but also helps stabilize blood pressures from laying down during surgery and recovery. Reviewed floor recovery, walker distribution , working with physical therapy and occupational therapy prior to discharge. Reviewed postoperative showering instructions. Patient was educated that all dressings are waterproof and that they may shower within 2 days of surgery. No tubs or submersion in water is allowed for a full six weeks to allow for healing. Patient was reminded that drainage on dressing is not an emergency room visit, rather a call to their surgeon for a dressing change. Reviewed typical medications that patient is discharged with,  to include blood thinner to prevent blood clots, scheduled tylenol, narcotic pain medication and anti inflammatory medication. Patient was reminded that the best prevention for blood clots is ambulation. Patient was encouraged to limit ambulation to 5-10 minutes of short walks hourly, followed by icing 20 minutes, never to be placed directly on skin. Patient was educated that they will be issued the appropriate length compression stockings prior to discharge. They were educated that these stockings are to be worn during the daytime hours only to assist with  swelling and

## 2025-06-24 NOTE — TELEPHONE ENCOUNTER
Patient called to follow up on his handicap placard form request.     Patient can be reached at [phone].

## 2025-06-25 ENCOUNTER — HOSPITAL ENCOUNTER (OUTPATIENT)
Facility: HOSPITAL | Age: 60
Discharge: HOME | End: 2025-06-25
Attending: ORTHOPAEDIC SURGERY | Admitting: ORTHOPAEDIC SURGERY
Payer: COMMERCIAL

## 2025-06-25 ENCOUNTER — ANESTHESIA (OUTPATIENT)
Facility: HOSPITAL | Age: 60
End: 2025-06-25
Payer: COMMERCIAL

## 2025-06-25 ENCOUNTER — APPOINTMENT (OUTPATIENT)
Facility: HOSPITAL | Age: 60
End: 2025-06-25
Attending: ORTHOPAEDIC SURGERY
Payer: COMMERCIAL

## 2025-06-25 VITALS
OXYGEN SATURATION: 97 % | RESPIRATION RATE: 16 BRPM | TEMPERATURE: 97.7 F | BODY MASS INDEX: 37.19 KG/M2 | HEART RATE: 66 BPM | WEIGHT: 315 LBS | DIASTOLIC BLOOD PRESSURE: 81 MMHG | SYSTOLIC BLOOD PRESSURE: 131 MMHG | HEIGHT: 77 IN

## 2025-06-25 LAB
AMPHET UR QL SCN: NEGATIVE
BARBITURATES UR QL SCN: NEGATIVE
BENZODIAZ UR QL: NEGATIVE
CANNABINOIDS UR QL SCN: NEGATIVE
COCAINE UR QL SCN: NEGATIVE
FENTANYL: NEGATIVE
Lab: NORMAL
METHADONE UR QL: NEGATIVE
OPIATES UR QL: NEGATIVE
OXYCODONE UR QL SCN: NEGATIVE

## 2025-06-25 PROCEDURE — 2500000003 HC RX 250 WO HCPCS: Performed by: ORTHOPAEDIC SURGERY

## 2025-06-25 PROCEDURE — 6360000002 HC RX W HCPCS: Performed by: ORTHOPAEDIC SURGERY

## 2025-06-25 PROCEDURE — 0055T BONE SRGRY CMPTR CT/MRI IMAG: CPT | Performed by: ORTHOPAEDIC SURGERY

## 2025-06-25 PROCEDURE — 73502 X-RAY EXAM HIP UNI 2-3 VIEWS: CPT

## 2025-06-25 PROCEDURE — 3600000014 HC SURGERY LEVEL 4 ADDTL 15MIN: Performed by: ORTHOPAEDIC SURGERY

## 2025-06-25 PROCEDURE — 6370000000 HC RX 637 (ALT 250 FOR IP): Performed by: ORTHOPAEDIC SURGERY

## 2025-06-25 PROCEDURE — 3600000004 HC SURGERY LEVEL 4 BASE: Performed by: ORTHOPAEDIC SURGERY

## 2025-06-25 PROCEDURE — 7100000000 HC PACU RECOVERY - FIRST 15 MIN: Performed by: ORTHOPAEDIC SURGERY

## 2025-06-25 PROCEDURE — 80307 DRUG TEST PRSMV CHEM ANLYZR: CPT

## 2025-06-25 PROCEDURE — 88311 DECALCIFY TISSUE: CPT

## 2025-06-25 PROCEDURE — 2580000003 HC RX 258: Performed by: NURSE ANESTHETIST, CERTIFIED REGISTERED

## 2025-06-25 PROCEDURE — 6370000000 HC RX 637 (ALT 250 FOR IP): Performed by: NURSE ANESTHETIST, CERTIFIED REGISTERED

## 2025-06-25 PROCEDURE — 3700000001 HC ADD 15 MINUTES (ANESTHESIA): Performed by: ORTHOPAEDIC SURGERY

## 2025-06-25 PROCEDURE — 2709999900 HC NON-CHARGEABLE SUPPLY: Performed by: ORTHOPAEDIC SURGERY

## 2025-06-25 PROCEDURE — 2720000010 HC SURG SUPPLY STERILE: Performed by: ORTHOPAEDIC SURGERY

## 2025-06-25 PROCEDURE — 97162 PT EVAL MOD COMPLEX 30 MIN: CPT

## 2025-06-25 PROCEDURE — 6360000002 HC RX W HCPCS: Performed by: ANESTHESIOLOGY

## 2025-06-25 PROCEDURE — 97165 OT EVAL LOW COMPLEX 30 MIN: CPT

## 2025-06-25 PROCEDURE — 3700000000 HC ANESTHESIA ATTENDED CARE: Performed by: ORTHOPAEDIC SURGERY

## 2025-06-25 PROCEDURE — 6360000002 HC RX W HCPCS

## 2025-06-25 PROCEDURE — 6370000000 HC RX 637 (ALT 250 FOR IP)

## 2025-06-25 PROCEDURE — 88304 TISSUE EXAM BY PATHOLOGIST: CPT

## 2025-06-25 PROCEDURE — 6360000002 HC RX W HCPCS: Performed by: NURSE ANESTHETIST, CERTIFIED REGISTERED

## 2025-06-25 PROCEDURE — 27130 TOTAL HIP ARTHROPLASTY: CPT

## 2025-06-25 PROCEDURE — 2500000003 HC RX 250 WO HCPCS: Performed by: NURSE ANESTHETIST, CERTIFIED REGISTERED

## 2025-06-25 PROCEDURE — 94761 N-INVAS EAR/PLS OXIMETRY MLT: CPT

## 2025-06-25 PROCEDURE — 97535 SELF CARE MNGMENT TRAINING: CPT

## 2025-06-25 PROCEDURE — 27130 TOTAL HIP ARTHROPLASTY: CPT | Performed by: ORTHOPAEDIC SURGERY

## 2025-06-25 PROCEDURE — 7100000001 HC PACU RECOVERY - ADDTL 15 MIN: Performed by: ORTHOPAEDIC SURGERY

## 2025-06-25 PROCEDURE — 2580000003 HC RX 258: Performed by: ORTHOPAEDIC SURGERY

## 2025-06-25 PROCEDURE — C1776 JOINT DEVICE (IMPLANTABLE): HCPCS | Performed by: ORTHOPAEDIC SURGERY

## 2025-06-25 DEVICE — 127 DEGREE NECK ANGLE HIP STEM
Type: IMPLANTABLE DEVICE | Site: HIP | Status: FUNCTIONAL
Brand: ACCOLADE

## 2025-06-25 DEVICE — CERAMIC V40 FEMORAL HEAD
Type: IMPLANTABLE DEVICE | Site: HIP | Status: FUNCTIONAL
Brand: BIOLOX

## 2025-06-25 DEVICE — TRIDENT X3 0 DEGREE POLYETHYLENE INSERT
Type: IMPLANTABLE DEVICE | Site: HIP | Status: FUNCTIONAL
Brand: TRIDENT X3 INSERT

## 2025-06-25 DEVICE — TRIDENT II TRITANIUM CLUSTER 62G
Type: IMPLANTABLE DEVICE | Site: HIP | Status: FUNCTIONAL
Brand: TRIDENT II

## 2025-06-25 RX ORDER — LIDOCAINE HYDROCHLORIDE 10 MG/ML
1 INJECTION, SOLUTION EPIDURAL; INFILTRATION; INTRACAUDAL; PERINEURAL
Status: DISCONTINUED | OUTPATIENT
Start: 2025-06-25 | End: 2025-06-25 | Stop reason: HOSPADM

## 2025-06-25 RX ORDER — DIPHENHYDRAMINE HYDROCHLORIDE 50 MG/ML
25 INJECTION, SOLUTION INTRAMUSCULAR; INTRAVENOUS EVERY 6 HOURS PRN
Status: DISCONTINUED | OUTPATIENT
Start: 2025-06-25 | End: 2025-06-25 | Stop reason: HOSPADM

## 2025-06-25 RX ORDER — FENTANYL CITRATE 50 UG/ML
25 INJECTION, SOLUTION INTRAMUSCULAR; INTRAVENOUS EVERY 5 MIN PRN
Status: COMPLETED | OUTPATIENT
Start: 2025-06-25 | End: 2025-06-25

## 2025-06-25 RX ORDER — OXYCODONE HYDROCHLORIDE 5 MG/1
5 TABLET ORAL EVERY 4 HOURS PRN
Status: DISCONTINUED | OUTPATIENT
Start: 2025-06-25 | End: 2025-06-25 | Stop reason: HOSPADM

## 2025-06-25 RX ORDER — FAMOTIDINE 20 MG/1
20 TABLET, FILM COATED ORAL ONCE
Status: COMPLETED | OUTPATIENT
Start: 2025-06-25 | End: 2025-06-25

## 2025-06-25 RX ORDER — ASPIRIN 81 MG/1
81 TABLET ORAL 2 TIMES DAILY
Status: DISCONTINUED | OUTPATIENT
Start: 2025-06-26 | End: 2025-06-25 | Stop reason: HOSPADM

## 2025-06-25 RX ORDER — ACETAMINOPHEN 325 MG/1
650 TABLET ORAL
Status: DISCONTINUED | OUTPATIENT
Start: 2025-06-25 | End: 2025-06-25 | Stop reason: HOSPADM

## 2025-06-25 RX ORDER — PROMETHAZINE HYDROCHLORIDE 12.5 MG/1
12.5 TABLET ORAL ONCE
Status: COMPLETED | OUTPATIENT
Start: 2025-06-25 | End: 2025-06-25

## 2025-06-25 RX ORDER — ONDANSETRON 4 MG/1
4 TABLET, ORALLY DISINTEGRATING ORAL EVERY 8 HOURS PRN
Status: DISCONTINUED | OUTPATIENT
Start: 2025-06-25 | End: 2025-06-25 | Stop reason: HOSPADM

## 2025-06-25 RX ORDER — SODIUM CHLORIDE 9 MG/ML
INJECTION, SOLUTION INTRAVENOUS PRN
Status: DISCONTINUED | OUTPATIENT
Start: 2025-06-25 | End: 2025-06-25 | Stop reason: HOSPADM

## 2025-06-25 RX ORDER — KETOROLAC TROMETHAMINE 15 MG/ML
30 INJECTION, SOLUTION INTRAMUSCULAR; INTRAVENOUS EVERY 6 HOURS
Status: DISCONTINUED | OUTPATIENT
Start: 2025-06-26 | End: 2025-06-25

## 2025-06-25 RX ORDER — LIDOCAINE HYDROCHLORIDE 20 MG/ML
INJECTION, SOLUTION EPIDURAL; INFILTRATION; INTRACAUDAL; PERINEURAL
Status: DISCONTINUED | OUTPATIENT
Start: 2025-06-25 | End: 2025-06-25 | Stop reason: SDUPTHER

## 2025-06-25 RX ORDER — FENTANYL CITRATE 50 UG/ML
50 INJECTION, SOLUTION INTRAMUSCULAR; INTRAVENOUS ONCE
Refills: 0 | Status: COMPLETED | OUTPATIENT
Start: 2025-06-25 | End: 2025-06-25

## 2025-06-25 RX ORDER — SODIUM CHLORIDE 0.9 % (FLUSH) 0.9 %
5-40 SYRINGE (ML) INJECTION PRN
Status: DISCONTINUED | OUTPATIENT
Start: 2025-06-25 | End: 2025-06-25 | Stop reason: HOSPADM

## 2025-06-25 RX ORDER — ACETAMINOPHEN 500 MG
1000 TABLET ORAL EVERY 8 HOURS SCHEDULED
Status: DISCONTINUED | OUTPATIENT
Start: 2025-06-25 | End: 2025-06-25 | Stop reason: HOSPADM

## 2025-06-25 RX ORDER — SODIUM CHLORIDE 0.9 % (FLUSH) 0.9 %
5-40 SYRINGE (ML) INJECTION EVERY 12 HOURS SCHEDULED
Status: DISCONTINUED | OUTPATIENT
Start: 2025-06-25 | End: 2025-06-25 | Stop reason: HOSPADM

## 2025-06-25 RX ORDER — EPHEDRINE SULFATE/0.9% NACL/PF 25 MG/5 ML
SYRINGE (ML) INTRAVENOUS
Status: DISCONTINUED | OUTPATIENT
Start: 2025-06-25 | End: 2025-06-25 | Stop reason: SDUPTHER

## 2025-06-25 RX ORDER — TAMSULOSIN HYDROCHLORIDE 0.4 MG/1
0.4 CAPSULE ORAL DAILY
Status: DISCONTINUED | OUTPATIENT
Start: 2025-06-25 | End: 2025-06-25 | Stop reason: HOSPADM

## 2025-06-25 RX ORDER — OXYCODONE HYDROCHLORIDE 10 MG/1
10 TABLET ORAL EVERY 4 HOURS PRN
Status: DISCONTINUED | OUTPATIENT
Start: 2025-06-25 | End: 2025-06-25 | Stop reason: HOSPADM

## 2025-06-25 RX ORDER — MELOXICAM 7.5 MG/1
15 TABLET ORAL ONCE
Status: COMPLETED | OUTPATIENT
Start: 2025-06-25 | End: 2025-06-25

## 2025-06-25 RX ORDER — FAMOTIDINE 20 MG/1
20 TABLET, FILM COATED ORAL 2 TIMES DAILY
Status: DISCONTINUED | OUTPATIENT
Start: 2025-06-25 | End: 2025-06-25 | Stop reason: HOSPADM

## 2025-06-25 RX ORDER — TRAMADOL HYDROCHLORIDE 50 MG/1
50 TABLET ORAL EVERY 6 HOURS
Status: DISCONTINUED | OUTPATIENT
Start: 2025-06-25 | End: 2025-06-25 | Stop reason: HOSPADM

## 2025-06-25 RX ORDER — KETOROLAC TROMETHAMINE 15 MG/ML
INJECTION, SOLUTION INTRAMUSCULAR; INTRAVENOUS
Status: DISCONTINUED | OUTPATIENT
Start: 2025-06-25 | End: 2025-06-25 | Stop reason: SDUPTHER

## 2025-06-25 RX ORDER — ONDANSETRON 2 MG/ML
INJECTION INTRAMUSCULAR; INTRAVENOUS
Status: DISCONTINUED | OUTPATIENT
Start: 2025-06-25 | End: 2025-06-25 | Stop reason: SDUPTHER

## 2025-06-25 RX ORDER — OXYCODONE HYDROCHLORIDE 5 MG/1
5 TABLET ORAL ONCE
Status: COMPLETED | OUTPATIENT
Start: 2025-06-25 | End: 2025-06-25

## 2025-06-25 RX ORDER — PROPOFOL 10 MG/ML
INJECTION, EMULSION INTRAVENOUS
Status: DISCONTINUED | OUTPATIENT
Start: 2025-06-25 | End: 2025-06-25 | Stop reason: SDUPTHER

## 2025-06-25 RX ORDER — ACETAMINOPHEN 500 MG
1000 TABLET ORAL ONCE
Status: COMPLETED | OUTPATIENT
Start: 2025-06-25 | End: 2025-06-25

## 2025-06-25 RX ORDER — SODIUM PHOSPHATE, DIBASIC AND SODIUM PHOSPHATE, MONOBASIC 7; 19 G/230ML; G/230ML
1 ENEMA RECTAL DAILY PRN
Status: DISCONTINUED | OUTPATIENT
Start: 2025-06-25 | End: 2025-06-25 | Stop reason: HOSPADM

## 2025-06-25 RX ORDER — ONDANSETRON 2 MG/ML
4 INJECTION INTRAMUSCULAR; INTRAVENOUS EVERY 6 HOURS PRN
Status: DISCONTINUED | OUTPATIENT
Start: 2025-06-25 | End: 2025-06-25 | Stop reason: HOSPADM

## 2025-06-25 RX ORDER — ONDANSETRON 2 MG/ML
4 INJECTION INTRAMUSCULAR; INTRAVENOUS
Status: DISCONTINUED | OUTPATIENT
Start: 2025-06-25 | End: 2025-06-25 | Stop reason: HOSPADM

## 2025-06-25 RX ORDER — MAGNESIUM HYDROXIDE 1200 MG/15ML
LIQUID ORAL CONTINUOUS PRN
Status: DISCONTINUED | OUTPATIENT
Start: 2025-06-25 | End: 2025-06-25 | Stop reason: HOSPADM

## 2025-06-25 RX ORDER — KETOROLAC TROMETHAMINE 15 MG/ML
15 INJECTION, SOLUTION INTRAMUSCULAR; INTRAVENOUS EVERY 6 HOURS
Status: DISCONTINUED | OUTPATIENT
Start: 2025-06-26 | End: 2025-06-25 | Stop reason: HOSPADM

## 2025-06-25 RX ORDER — VANCOMYCIN HYDROCHLORIDE 1 G/20ML
INJECTION, POWDER, LYOPHILIZED, FOR SOLUTION INTRAVENOUS PRN
Status: DISCONTINUED | OUTPATIENT
Start: 2025-06-25 | End: 2025-06-25 | Stop reason: HOSPADM

## 2025-06-25 RX ORDER — SODIUM CHLORIDE, SODIUM LACTATE, POTASSIUM CHLORIDE, CALCIUM CHLORIDE 600; 310; 30; 20 MG/100ML; MG/100ML; MG/100ML; MG/100ML
INJECTION, SOLUTION INTRAVENOUS CONTINUOUS
Status: DISCONTINUED | OUTPATIENT
Start: 2025-06-25 | End: 2025-06-25 | Stop reason: HOSPADM

## 2025-06-25 RX ORDER — NALOXONE HYDROCHLORIDE 0.4 MG/ML
INJECTION, SOLUTION INTRAMUSCULAR; INTRAVENOUS; SUBCUTANEOUS PRN
Status: DISCONTINUED | OUTPATIENT
Start: 2025-06-25 | End: 2025-06-25 | Stop reason: HOSPADM

## 2025-06-25 RX ORDER — POLYETHYLENE GLYCOL 3350 17 G/17G
17 POWDER, FOR SOLUTION ORAL DAILY PRN
Status: DISCONTINUED | OUTPATIENT
Start: 2025-06-25 | End: 2025-06-25 | Stop reason: HOSPADM

## 2025-06-25 RX ORDER — BISACODYL 5 MG/1
5 TABLET, DELAYED RELEASE ORAL DAILY
Status: DISCONTINUED | OUTPATIENT
Start: 2025-06-25 | End: 2025-06-25 | Stop reason: HOSPADM

## 2025-06-25 RX ORDER — DIPHENHYDRAMINE HCL 25 MG
25 CAPSULE ORAL EVERY 6 HOURS PRN
Status: DISCONTINUED | OUTPATIENT
Start: 2025-06-25 | End: 2025-06-25 | Stop reason: HOSPADM

## 2025-06-25 RX ORDER — MIDAZOLAM HYDROCHLORIDE 1 MG/ML
INJECTION, SOLUTION INTRAMUSCULAR; INTRAVENOUS
Status: DISCONTINUED | OUTPATIENT
Start: 2025-06-25 | End: 2025-06-25 | Stop reason: SDUPTHER

## 2025-06-25 RX ORDER — DEXAMETHASONE SODIUM PHOSPHATE 10 MG/ML
10 INJECTION, SOLUTION INTRAMUSCULAR; INTRAVENOUS ONCE
Status: COMPLETED | OUTPATIENT
Start: 2025-06-25 | End: 2025-06-25

## 2025-06-25 RX ADMIN — FAMOTIDINE 20 MG: 20 TABLET, FILM COATED ORAL at 06:54

## 2025-06-25 RX ADMIN — ONDANSETRON 4 MG: 2 INJECTION INTRAMUSCULAR; INTRAVENOUS at 10:29

## 2025-06-25 RX ADMIN — TRANEXAMIC ACID 1000 MG: 100 INJECTION, SOLUTION INTRAVENOUS at 10:29

## 2025-06-25 RX ADMIN — PROMETHAZINE HYDROCHLORIDE 12.5 MG: 12.5 TABLET ORAL at 06:54

## 2025-06-25 RX ADMIN — KETOROLAC TROMETHAMINE 15 MG: 15 INJECTION, SOLUTION INTRAMUSCULAR; INTRAVENOUS at 10:29

## 2025-06-25 RX ADMIN — ACETAMINOPHEN 1000 MG: 500 TABLET ORAL at 06:54

## 2025-06-25 RX ADMIN — MEPIVACAINE HYDROCHLORIDE 52 MG: 20 INJECTION, SOLUTION EPIDURAL; INFILTRATION at 07:50

## 2025-06-25 RX ADMIN — LIDOCAINE HYDROCHLORIDE 50 MG: 20 INJECTION, SOLUTION EPIDURAL; INFILTRATION; INTRACAUDAL; PERINEURAL at 07:57

## 2025-06-25 RX ADMIN — EPHEDRINE SULFATE 10 MG: 5 INJECTION INTRAVENOUS at 08:50

## 2025-06-25 RX ADMIN — TRANEXAMIC ACID 1000 MG: 100 INJECTION, SOLUTION INTRAVENOUS at 08:10

## 2025-06-25 RX ADMIN — EPHEDRINE SULFATE 10 MG: 5 INJECTION INTRAVENOUS at 08:32

## 2025-06-25 RX ADMIN — TRAMADOL HYDROCHLORIDE 50 MG: 50 TABLET, COATED ORAL at 12:49

## 2025-06-25 RX ADMIN — MIDAZOLAM 2 MG: 1 INJECTION, SOLUTION INTRAMUSCULAR; INTRAVENOUS at 07:34

## 2025-06-25 RX ADMIN — MELOXICAM 15 MG: 7.5 TABLET ORAL at 06:54

## 2025-06-25 RX ADMIN — SODIUM CHLORIDE, SODIUM LACTATE, POTASSIUM CHLORIDE, AND CALCIUM CHLORIDE: 600; 310; 30; 20 INJECTION, SOLUTION INTRAVENOUS at 07:02

## 2025-06-25 RX ADMIN — PROPOFOL 50 MG: 10 INJECTION, EMULSION INTRAVENOUS at 07:58

## 2025-06-25 RX ADMIN — TAMSULOSIN HYDROCHLORIDE 0.4 MG: 0.4 CAPSULE ORAL at 07:06

## 2025-06-25 RX ADMIN — FENTANYL CITRATE 50 MCG: 50 INJECTION INTRAMUSCULAR; INTRAVENOUS at 11:20

## 2025-06-25 RX ADMIN — FENTANYL CITRATE 25 MCG: 50 INJECTION INTRAMUSCULAR; INTRAVENOUS at 11:13

## 2025-06-25 RX ADMIN — DEXAMETHASONE SODIUM PHOSPHATE 10 MG: 10 INJECTION INTRAMUSCULAR; INTRAVENOUS at 08:00

## 2025-06-25 RX ADMIN — FENTANYL CITRATE 25 MCG: 50 INJECTION INTRAMUSCULAR; INTRAVENOUS at 11:19

## 2025-06-25 RX ADMIN — WATER 3000 MG: 1 INJECTION INTRAMUSCULAR; INTRAVENOUS; SUBCUTANEOUS at 08:00

## 2025-06-25 RX ADMIN — SODIUM CHLORIDE, SODIUM LACTATE, POTASSIUM CHLORIDE, AND CALCIUM CHLORIDE: 600; 310; 30; 20 INJECTION, SOLUTION INTRAVENOUS at 10:32

## 2025-06-25 RX ADMIN — PROPOFOL 100 MCG/KG/MIN: 10 INJECTION, EMULSION INTRAVENOUS at 07:57

## 2025-06-25 RX ADMIN — OXYCODONE HYDROCHLORIDE 5 MG: 5 TABLET ORAL at 12:15

## 2025-06-25 ASSESSMENT — PAIN DESCRIPTION - ORIENTATION
ORIENTATION: RIGHT
ORIENTATION: LOWER;POSTERIOR
ORIENTATION: RIGHT

## 2025-06-25 ASSESSMENT — PAIN DESCRIPTION - LOCATION
LOCATION: HIP
LOCATION: BACK
LOCATION: HIP

## 2025-06-25 ASSESSMENT — PAIN DESCRIPTION - DESCRIPTORS
DESCRIPTORS: ACHING

## 2025-06-25 ASSESSMENT — PAIN - FUNCTIONAL ASSESSMENT
PAIN_FUNCTIONAL_ASSESSMENT: 0-10
PAIN_FUNCTIONAL_ASSESSMENT: ACTIVITIES ARE NOT PREVENTED

## 2025-06-25 ASSESSMENT — PAIN SCALES - GENERAL
PAINLEVEL_OUTOF10: 10
PAINLEVEL_OUTOF10: 10
PAINLEVEL_OUTOF10: 5
PAINLEVEL_OUTOF10: 7
PAINLEVEL_OUTOF10: 1
PAINLEVEL_OUTOF10: 10
PAINLEVEL_OUTOF10: 10
PAINLEVEL_OUTOF10: 7

## 2025-06-25 ASSESSMENT — PAIN DESCRIPTION - PAIN TYPE
TYPE: SURGICAL PAIN

## 2025-06-25 ASSESSMENT — LIFESTYLE VARIABLES: SMOKING_STATUS: 1

## 2025-06-25 NOTE — PROGRESS NOTES
Patient received from PACU. Patient is awake and alert. Patient ambulated from the stretcher to the chair. Patient alert and oriented to room and how to use the call bell. Wife at the bedside.

## 2025-06-25 NOTE — PERIOP NOTE
Patient /Family /Designee has been informed that [facility] is not responsible for patient belongings per policy and the signed [facility] Patient Agreement document.  Personal items should be sent home or checked in with security.  Patient /Family /Designee selected the following action:                            [x]  Send personal items home with a family member or friend                                                 []  Check in personal items with security, excluding clothing                            []  Maintain personal items at the bedside, against recommendation                                 by Chandler Petersen [facility]          Patients wife took all belongings including yellow wedding band, eye glasses, phone and clothing.

## 2025-06-25 NOTE — OP NOTE
Operative Note      Patient: Ramsey Ortega  YOB: 1965  MRN: [id number]    Date of Procedure: 6/25/2025    Pre-Op Diagnosis Codes:      * Primary osteoarthritis of right hip [M16.11]  Obesity with BMI 40    Post-Op Diagnosis: Same       Procedure(s):  right Hip Total Arthroplasty with JUAN, direct anterior approach    Surgeon(s):  Abdi Garza DO    Assistant:   Surgical Assistant: J Carlos Hoffmann  Physician Assistant: Ashtyn Pan PA-C    Anesthesia: Spinal    Estimated Blood Loss (mL): 300     Complications: None    Specimens:   ID Type Source Tests Collected by Time Destination   A : Right Femur Head Bone Hip SURGICAL PATHOLOGY Abdi Garza DO 6/25/2025 1026        Implants:  Implant Name Type Inv. Item Serial No.  Lot No. LRB No. Used Action   SHELL ACET SZ G [id number] CLUS H TRITANIUM PRESSFIT KIMO - [id number]  SHELL ACET SZ G [id number] 5 CLUS H TRITANIUM PRESSFIT KIMO [id number] [id number] Right 1 Implanted   INSERT ACET G 0 DEG 36 MM HIP X3 TRIDENT - [id number]  INSERT ACET G 0 DEG 36 MM HIP X3 TRIDENT [id number] [id number] Right 1 Implanted   STEM FEM SZ 10 L123MM NK L40MM 57MM OFFSET 127DEG HIP TI - [id number]  STEM FEM SZ 10 L123MM NK L40MM 57MM OFFSET 127DEG HIP TI [id number] [id number] Right 1 Implanted   HEAD FEM [id number] +5MM OFFSET HIP BIOLOX DELT CERAMIC TAPR - [id number]  HEAD FEM [id number] +5MM OFFSET HIP BIOLOX DELT CERAMIC TAPR [id number] [id number] Right 1 Implanted         Drains: * No LDAs found *    Findings:  Infection Present At Time Of Surgery (PATOS) (choose all levels that have infection present):  No infection present  Other Findings: see below    Implants:   [place]:   Femur stem: Accolade II size 10, 127 degree   Acetabulum cup: 62mm Trident II Tritanium Cluster hole   Bearing: neutral poly   Head: 36mm ceramic, +5mm offset      A 22 modifier will

## 2025-06-25 NOTE — NURSE NAVIGATOR
Rounded on patient s/p right total hip replacement with Dr. Garza, dos 06/25/2025. Patient observed to be alert and oriented x 4 ,sitting up in bedside chair. He denies chest pain, shortness of breath, nausea, or vomiting. He denies any residual numbness in his right lower extremity, he has quadricept fire in his right leg. Pain Is much better controlled since tramadol administration and mobility. Patient was able to ambulate with use of walker and steady gait to bedside chair without difficulty. He is tolerating food and beverage at this time. PT notified that patient is ready to work with them.       Dressing to right anterior hip observed to be clean, dry and intact with ice pack in place for comfort. Small dressing on left hip observed to be clean, dry and intact.      Patient did attend the total joint class a few days ago and all education was provided to the patient at that time. He was issued his total hip replacement education book during class.        Today patient was provided with a medication education sheet, medication schedule, frequently asked hip question hand out and a rolling walker. He lives in a two story home with three steps to enter. His bedroom is upstairs , but he reports that he will be staying downstairs for the first few nights. In his home he has a tub shower combination .        He was reminded that the compression stockings are to assist with swelling in the foot and ankle region and are for daytime wear only. He was instructed to remove them nightly.        Reviewed the use of incentive spirometry. Patient encouraged to use ten times hourly while in hospital and to continue use at home for the next few days to keep lungs expanded and free from complications. He was educated that lack of use could result in low grade fevers of 99- 100. He was reminded that fevers are not worrisome unless they go above 101.3 and remain despite use of incentive spirometry.           Reviewed

## 2025-06-25 NOTE — ANESTHESIA POSTPROCEDURE EVALUATION
Department of Anesthesiology  Postprocedure Note    Patient: Ramsey Ortega  MRN: [id number]  YOB: 1965  Date of evaluation: 6/25/2025    Procedure Summary       Date: 06/25/25 Room / Location: [facility] MAIN 06 / [facility] MAIN OR    Anesthesia Start: 0734 Anesthesia Stop: 1105    Procedure: right Hip Total Arthroplasty with JUAN, direct anterior approach (Right: Hip) Diagnosis:       Primary osteoarthritis of right hip      (Primary osteoarthritis of right hip [M16.11])    Surgeons: Abdi Garza DO Responsible Provider: Carlos Marrufo Jr., MD    Anesthesia Type: spinal ASA Status: 3            Anesthesia Type: No value filed.    Ashish Phase I: Ashish Score: 10    Ashish Phase II:      Anesthesia Post Evaluation    Patient location during evaluation: bedside  Patient participation: complete - patient participated  Level of consciousness: responsive to verbal stimuli  Airway patency: patent  Nausea & Vomiting: no nausea  Respiratory status: acceptable  Hydration status: euvolemic    No notable events documented.

## 2025-06-25 NOTE — PROGRESS NOTES
OCCUPATIONAL THERAPY EVALUATION/DISCHARGE    Patient: Ramsey Ortega (60 y.o. male)  Date: 6/25/2025  Primary Diagnosis: Primary osteoarthritis of right hip [M16.11]  Procedure(s) (LRB):  right Hip Total Arthroplasty with JUAN, direct anterior approach (Right) * Day of Surgery *   Precautions: Weight Bearing, Surgical Protocols, Right Lower Extremity Weight Bearing: Weight Bearing As Tolerated,Hip Precautions: Anterior hip precautions  PLOF: Pt lives with spouse, usually independent.    ASSESSMENT AND RECOMMENDATIONS:    Patient educated on the role of OT, anterior hip precautions and how they relate to ADLs and functional mobility. Pt verbalized understanding and was able to return education. Pt demod difficulty with socks management, educated on AE for LB ADLs and issued sock aid, long handled shoe horn, and long handled sponge. Following education pt was able to perform LB dressing and bathing with Supervision. Pt got dressed for DC home. Pt reports no further concerns to OT. Pt has supportive family at home to assist as needed for ADls and provide Supervision for safety.  Maximum therapeutic gains met at current level of care and patient will be discharged from occupational therapy at this time.    Further Equipment Recommendations for Discharge: Patient has all needed DME for home safety.    AMPA: AM-PAC Inpatient Daily Activity Raw Score: 22    Current research shows that an AM-PAC score of 18 or greater is associated with a discharge to the patient's home setting.    This AMPAC score should be considered in conjunction with interdisciplinary team recommendations to determine the most appropriate discharge setting. Patient's social support, diagnosis, medical stability, and prior level of function should also be taken into consideration.     SUBJECTIVE:   Patient stated “I feel okay.”    OBJECTIVE DATA SUMMARY:     Past Medical History:   Diagnosis Date    EBONY (acute kidney injury)     Atypical chest  pain     Hypertension     Hypokalemia     Obesity (BMI 30.0-34.9) 4/24/2017 6/13/2025 - BMI 39.75    KATINA on CPAP 2018    Renal cell carcinoma of right kidney (HCC) 12/23/2014    Partial Rt. Nephrectomy    Right hip pain     Groin     Past Surgical History:   Procedure Laterality Date    PARTIAL NEPHRECTOMY Right 12/23/2014    WISDOM TOOTH EXTRACTION         Home Situation:   Social/Functional History  Lives With: Spouse  Type of Home: House  Home Layout: Two level, Able to Live on Main level with bedroom/bathroom  Home Access: Stairs to enter without rails  Entrance Stairs - Number of Steps: 3  Home Equipment: Walker - Rolling  []  Right hand dominant   []  Left hand dominant    Cognitive/Behavioral Status:  Orientation  Overall Orientation Status: Within Functional Limits  Orientation Level: Oriented X4  Cognition  Overall Cognitive Status: WFL    Skin: visible skin intact  Edema: none noted    Vision/Perceptual:    Vision  Vision: Within Functional Limits       Coordination: BUE  Coordination: Within functional limits       Balance:     Balance  Sitting: Intact  Standing: Impaired;With support  Standing - Static: Good  Standing - Dynamic: Good    Strength: BUE  Strength: Within functional limits    Tone & Sensation: BUE  Tone: Normal  Sensation: Intact    Range of Motion: BUE  AROM: Within functional limits       Functional Mobility and Transfers for ADLs:  Bed Mobility:     Bed Mobility Training  Bed Mobility Training: No  Transfers:         Transfer Training  Transfer Training: Yes  Sit to Stand: Supervision  Stand to Sit: Supervision    ADL Assessment:   Feeding: Independent  Grooming: Independent  UE Bathing: Modified independent   LE Bathing: Supervision  UE Dressing: Modified independent   LE Dressing: Supervision  Toileting: Supervision     ADL Intervention:  Min A for socks management initially, educated on sock aid, following which pt demod socks management with supervision.   Educated on and issued

## 2025-06-25 NOTE — ANESTHESIA PROCEDURE NOTES
Spinal Block    Patient location during procedure: OR  Reason for block: primary anesthetic  Staffing  Performed: resident/CRNA and anesthesiologist   Anesthesiologist: Carlos Marrufo Jr., MD  Resident/CRNA: Lindsey Saldivar APRN - CRNA  Performed by: Lindsey Saldivar APRN - CRNA  Authorized by: Carlos Marrufo Jr., MD    Spinal Block  Patient position: sitting  Prep: Betadine  Patient monitoring: cardiac monitor, continuous pulse ox and frequent blood pressure checks  Approach: midline  Location: L4/L5  Guidance: paresthesia technique  Provider prep: mask and sterile gloves  Needle  Needle type: Quincke   Needle gauge: 25 G  Needle length: 3.5 in  Assessment  Sensory level: T8  Events: cerebrospinal fluid  Swirl obtained: Yes  CSF: clear  Attempts: 2  Hemodynamics: stable  Preanesthetic Checklist  Completed: patient identified, IV checked, site marked, risks and benefits discussed, surgical/procedural consents, equipment checked, pre-op evaluation, timeout performed, anesthesia consent given, oxygen available, monitors applied/VS acknowledged, fire risk safety assessment completed and verbalized and blood product R/B/A discussed and consented

## 2025-06-25 NOTE — PROGRESS NOTES
Physical Therapy    PHYSICAL THERAPY EVALUATION/DISCHARGE    Patient: Ramsey Ortega (60 y.o. male)  Date: 6/25/2025  Primary Diagnosis: Primary osteoarthritis of right hip [M16.11]  Procedure(s) (LRB):  right Hip Total Arthroplasty with JUAN, direct anterior approach (Right) * Day of Surgery *   Precautions: Weight Bearing, Right Lower Extremity Weight Bearing: Weight Bearing As Tolerated  PLOF: Lives in two story house with spouse. 3 steps to enter. Amb independently.    ASSESSMENT AND RECOMMENDATIONS:  Evaluated for skilled PT post R total hip arthroplasty. Educated pt on anterior hip precautions, walking every hour, and icing schedule. SBA for STS to RW. Amb 50ft SBA with RW to and from stairs. Negotiated 4 steps with b/l handrails SBA. Returned pt to chair with LE elevated. Educated on need for RN assistance with mobility; verbalized understanding. Call bell in reach.     Patient does not require further skilled physical therapy intervention at this level of care. Pt appropriate to discharge home.     Further Equipment Recommendations for Discharge: rolling walker    AM-PAC Inpatient Mobility Raw Score : 22    This [facility] score should be considered in conjunction with interdisciplinary team recommendations to determine the most appropriate discharge setting. Patient's social support, diagnosis, medical stability, and prior level of function should also be taken into consideration.    Current research shows that an AM-PAC score of 18 (14 without stairs) or greater is associated with a discharge to the patient's home setting.     SUBJECTIVE:   Patient stated “My steps are deep and concrete.”    OBJECTIVE DATA SUMMARY:     Past Medical History:   Diagnosis Date    EBONY (acute kidney injury)     Atypical chest pain     Hypertension     Hypokalemia     Obesity (BMI 30.0-34.9) 4/24/2017 6/13/2025 - BMI 39.75    KATINA on CPAP 2018    Renal cell carcinoma of right kidney (HCC) 12/23/2014    Partial Rt.

## 2025-06-25 NOTE — PERIOP NOTE
1220: TRANSFER - OUT REPORT:    Telephone report given to LUPE Bertrand on Ramsey Ortega  being transferred to [facility] Surgical (2214) for routine post-op       Report consisted of patient's Situation, Background, Assessment and   Recommendations(SBAR).     Information from the following report(s) Nurse Handoff Report, Index, Adult Overview, and Surgery Report was reviewed with the receiving nurse.           Lines:       Opportunity for questions and clarification was provided.      Patient transported with:  Registered Nurse

## 2025-06-25 NOTE — PROGRESS NOTES
Ketorolac dose was capped at 15 mg per P&T policy.The dose is recommended not to exceed 15mg for Ketorolac IM/IV to potentially minimize dose related adverse effects without compromising analgesia.    Thank you,  Katie Gold RP  6/25/2025, 1:40 PM

## 2025-06-25 NOTE — ANESTHESIA PRE PROCEDURE
Department of Anesthesiology  Preprocedure Note       Name:  Ramsey Ortega   Age:  60 y.o.  :  1965                                          MRN:  [id number]         Date:  2025      Surgeon: Surgeon(s):  Abdi Garza DO    Procedure: Procedure(s):  RIGHT TOTAL HIP ARTHROPLASTY DIRECT ANTERIOR APPROACH JUAN PROTOCOL; [[facility]]; C-ARM; HANA TABLE; 2 SA’S; ERAS    Medications prior to admission:   Prior to Admission medications    Medication Sig Start Date End Date Taking? Authorizing Provider   buPROPion (WELLBUTRIN SR) 150 MG extended release tablet Take 1 tablet by mouth 2 times daily 25  Yes Provider, Historical, MD   naltrexone (DEPADE) 50 MG tablet Take 1 tablet by mouth daily 25  Yes Provider, Historical, MD   cetirizine (ZYRTEC) 10 MG tablet Take 1 tablet by mouth daily as needed for Allergies   Yes Provider, Historical, MD   spironolactone (ALDACTONE) 25 MG tablet Take 1 tablet by mouth 2 times daily 3/4/24  Yes Provider, Historical, MD   metoprolol succinate (TOPROL XL) 100 MG extended release tablet Take 1 tablet by mouth daily   Yes Provider, Historical, MD   amLODIPine (NORVASC) 10 MG tablet Take by mouth daily   Yes Automatic Reconciliation, Ar   aspirin 81 MG chewable tablet Take 1 tablet by mouth daily 14  Yes Automatic Reconciliation, Ar   ondansetron (ZOFRAN-ODT) 8 MG TBDP disintegrating tablet Take 1 tablet by mouth every 8 hours as needed for Nausea or Vomiting  Patient not taking: Reported on 2025   Ashtyn Pan PA-C   acetaminophen (TYLENOL) 500 MG tablet Take 2 tablets by mouth in the morning and 2 tablets at noon and 2 tablets in the evening. 25  Ashtyn Pan PA-C   pantoprazole (PROTONIX) 40 MG tablet Take 1 tablet by mouth daily  Patient not taking: Reported on 2025  Ashtyn Pan PA-C   meloxicam (MOBIC) 15 MG tablet Take 1 tablet by mouth daily Start with 1 pill the day before

## 2025-06-27 ENCOUNTER — TELEPHONE (OUTPATIENT)
Facility: HOSPITAL | Age: 60
End: 2025-06-27

## 2025-06-27 NOTE — TELEPHONE ENCOUNTER
Attempted to reach patient regarding postoperative follow up . VM left for patient to return call to coordinator. No PHI left.

## 2025-07-07 ENCOUNTER — OFFICE VISIT (OUTPATIENT)
Age: 60
End: 2025-07-07
Payer: COMMERCIAL

## 2025-07-07 DIAGNOSIS — Z96.641 PRESENCE OF RIGHT ARTIFICIAL HIP JOINT: Primary | ICD-10-CM

## 2025-07-07 PROCEDURE — 99024 POSTOP FOLLOW-UP VISIT: CPT

## 2025-07-07 PROCEDURE — 73502 X-RAY EXAM HIP UNI 2-3 VIEWS: CPT

## 2025-07-07 NOTE — PROGRESS NOTES
Patient: Ramsey Ortega                MRN: [id number]       SSN: [id number]  YOB: 1965        AGE: 60 y.o.        SEX: male  BMI: There is no height or weight on file to calculate BMI.    PCP: Haresh Storey MD  07/07/25    Chief Complaint: Post-Op Check and Hip Pain (Right hip )      1. Presence of right artificial hip joint  -     AMB POC X-RAY RADEX HIP UNI WITH PELVIS 2-3 VIEWS        HPI:  Ramsey Ortega is a 60 y.o. male with chief complaint of   Chief Complaint   Patient presents with    Post-Op Check    Hip Pain     Right hip      DOS SURGERY   6/25/25 Right total hip arthroplasty with Jose D, direct anterior approach  Implants:                Otho:                Femur stem: Accolade II size 10, 127 degree                Acetabulum cup: 62mm Trident II Tritanium Cluster hole                Bearing: neutral poly                Head: 36mm ceramic, +5mm offset         4/10/2025     4:25 PM   AMB PAIN ASSESSMENT   Location of Pain Hip   Location Modifiers Right   Severity of Pain 8   Quality of Pain Aching     Allergies   Allergen Reactions    Benazepril Anaphylaxis    Lisinopril Anaphylaxis    Gadolinium Derivatives Itching     Other reaction(s): Unknown (comments)  Pt had itching, nausea, perfuse sweating, lightheadedness----taken to ed for evaluation     - 2/10/2025: Left hip intra-articular fluoroscopic guided cortisone injection  -1/3/2025: Right hip intra-articular fluoroscopic guided cortisone injection    New patient referred to me by Dr. Savage for bilateral hip pain, right worse than left.  He has gotten relief in the past from intra-articular cortisone injections but this most recent injections only gave fleeting relief.  He can no longer tolerate the pain and has had to go to the emergency room because of the significance of it.  His significantly making his ability to perform his duties at work worse and the pain has been going on for about 4 to 5

## 2025-07-24 ENCOUNTER — TELEPHONE (OUTPATIENT)
Age: 60
End: 2025-07-24

## 2025-07-24 NOTE — TELEPHONE ENCOUNTER
Patient called. He states he is four weeks out from surgery but he is having some tightness in the front of his thigh, that the muscles feel tight and occasionally feel almost weak but not enough to fall. He is asking if that is expected with the healing process or if it is something to be concerned about.    Please review and advise patient, [phone]

## 2025-07-25 NOTE — TELEPHONE ENCOUNTER
Attempted to call patient. Left generic voicemail. If patient calls back please informed patient of Dr. Garza message...      Please call the patient and let him know that is not uncommon.  Make sure you are using either a cane or a walker to protect yourself until this passes.  Keep your appointment for August 4 and we will evaluated at that time.

## 2025-07-29 ENCOUNTER — TELEPHONE (OUTPATIENT)
Age: 60
End: 2025-07-29

## 2025-07-29 NOTE — TELEPHONE ENCOUNTER
Patient called. He is on vacation with his family and is asking if he is allowed to go in the pool/ hot tub. He is post op almost 5 weeks and his incision is healed. He just wanted to check and make sure.    Tel. [phone]

## 2025-08-08 ENCOUNTER — OFFICE VISIT (OUTPATIENT)
Age: 60
End: 2025-08-08

## 2025-08-08 DIAGNOSIS — Z96.641 PRESENCE OF RIGHT ARTIFICIAL HIP JOINT: Primary | ICD-10-CM

## 2025-08-08 PROCEDURE — 99024 POSTOP FOLLOW-UP VISIT: CPT

## 2025-08-12 DIAGNOSIS — M16.11 PRIMARY OSTEOARTHRITIS OF RIGHT HIP: ICD-10-CM

## 2025-08-22 RX ORDER — MELOXICAM 15 MG/1
TABLET ORAL
Qty: 30 TABLET | Refills: 0 | Status: SHIPPED | OUTPATIENT
Start: 2025-08-22

## 2025-08-26 ENCOUNTER — OFFICE VISIT (OUTPATIENT)
Age: 60
End: 2025-08-26

## 2025-08-26 DIAGNOSIS — M25.512 LEFT SHOULDER PAIN, UNSPECIFIED CHRONICITY: Primary | ICD-10-CM

## 2025-08-26 DIAGNOSIS — M75.52 BURSITIS OF LEFT SHOULDER: ICD-10-CM

## 2025-08-26 RX ORDER — TRIAMCINOLONE ACETONIDE 40 MG/ML
40 INJECTION, SUSPENSION INTRA-ARTICULAR; INTRAMUSCULAR ONCE
Status: COMPLETED | OUTPATIENT
Start: 2025-08-26 | End: 2025-08-26

## 2025-08-26 RX ORDER — LIDOCAINE 50 MG/G
1 PATCH TOPICAL DAILY
Qty: 30 PATCH | Refills: 0 | Status: SHIPPED | OUTPATIENT
Start: 2025-08-26 | End: 2025-09-25

## 2025-08-26 RX ORDER — LIDOCAINE HYDROCHLORIDE 10 MG/ML
9 INJECTION, SOLUTION INFILTRATION; PERINEURAL ONCE
Status: COMPLETED | OUTPATIENT
Start: 2025-08-26 | End: 2025-08-26

## 2025-08-26 RX ADMIN — LIDOCAINE HYDROCHLORIDE 9 ML: 10 INJECTION, SOLUTION INFILTRATION; PERINEURAL at 14:26

## 2025-08-26 RX ADMIN — TRIAMCINOLONE ACETONIDE 40 MG: 40 INJECTION, SUSPENSION INTRA-ARTICULAR; INTRAMUSCULAR at 14:27

## 2025-09-05 ENCOUNTER — OFFICE VISIT (OUTPATIENT)
Age: 60
End: 2025-09-05

## 2025-09-05 VITALS — HEIGHT: 77 IN | BODY MASS INDEX: 37.19 KG/M2 | WEIGHT: 315 LBS

## 2025-09-05 DIAGNOSIS — Z96.641 STATUS POST RIGHT HIP REPLACEMENT: ICD-10-CM

## 2025-09-05 DIAGNOSIS — M16.12 PRIMARY OSTEOARTHRITIS OF LEFT HIP: Primary | ICD-10-CM

## 2025-09-05 PROCEDURE — 99024 POSTOP FOLLOW-UP VISIT: CPT

## (undated) DEVICE — PIN BNE FIX TEMP L170MM DIA4MM MAKO

## (undated) DEVICE — CERAMIC V40 FEMORAL HEAD
Type: IMPLANTABLE DEVICE | Site: HIP | Status: NON-FUNCTIONAL
Brand: BIOLOX
Removed: 2025-06-25

## (undated) DEVICE — DRAPE,TOP,102X53,STERILE: Brand: MEDLINE

## (undated) DEVICE — KIT TRK HIP PROC VIZADISC

## (undated) DEVICE — 3M™ STERI-DRAPE™ U-DRAPE 1015: Brand: STERI-DRAPE™

## (undated) DEVICE — DRAPE SURG W48XL52IN POLY U FEN REINF ADV ADH MATTE FINISH

## (undated) DEVICE — APPLICATOR MEDICATED 26 CC SOLUTION HI LT ORNG CHLORAPREP

## (undated) DEVICE — Device

## (undated) DEVICE — NEEDLE SPNL 18GA L3.5IN W/ QNCKE SHARPER BVL DURA CLICK

## (undated) DEVICE — 2C14 #2 PDO 36 X 36: Brand: 2C14 #2 PDO 36 X 36

## (undated) DEVICE — ADHESIVE SKIN CLOSURE WND 8.661X1.5 IN 22 CM LIQUIBAND SECUR

## (undated) DEVICE — 3M™ STERI-DRAPE™ INSTRUMENT POUCH 1018: Brand: STERI-DRAPE™

## (undated) DEVICE — ELECTRODE PT RET AD L9FT HI MOIST COND ADH HYDRGEL CORDED

## (undated) DEVICE — STRYKER PERFORMANCE SERIES SAGITTAL BLADE: Brand: STRYKER PERFORMANCE SERIES

## (undated) DEVICE — RECIPROCATING BLADE HEAVY DUTY, OFFSET  (77.5 X 1.23 X 11.0MM)

## (undated) DEVICE — BLADE,STAINLESS-STEEL,10,STRL,DISPOSABLE: Brand: MEDLINE

## (undated) DEVICE — 2DSM19 2-0 UND MONODERM 30X30: Brand: 2DSM19 2-0 UND MONODERM 30X30

## (undated) DEVICE — MARKER RAD 3.5MM HEX CKPT STEREOTAXIC IMAG LESION LOC FOR

## (undated) DEVICE — MARKER RAD KNEE TIB CKPT STEREOTAXIC IMAG LESION LOC

## (undated) DEVICE — HOOD WITH PEEL AWAY FACE SHIELD: Brand: T7PLUS

## (undated) DEVICE — 4-PORT MANIFOLD: Brand: NEPTUNE 2

## (undated) DEVICE — DRAPE C ARM UNIV W41XL74IN CLR PLAS XR VELC CLSR POLY STRP

## (undated) DEVICE — SUIT SURG ISOLATN ZIP TOGA 2XL W/ PEELWY LENS T7 +

## (undated) DEVICE — SUTURE ETHIBOND EXCEL SZ 5 L30IN NONABSORBABLE GRN L40MM V-37 MB66G

## (undated) DEVICE — SUTURE VICRYL SZ 1 L18IN ABSRB VLT CT-1 L36MM 1/2 CIR J741D

## (undated) DEVICE — GLOVE SURG SZ 8 CRM LTX FREE POLYISOPRENE POLYMER BEAD ANTI

## (undated) DEVICE — HANDPIECE SET WITH HIGH FLOW TIP AND SUCTION TUBE: Brand: INTERPULSE

## (undated) DEVICE — ELECTRODE BLDE L4IN NONINSULATED EDGE

## (undated) DEVICE — DRESSING FOAM POST OPERATIVE 4X10 IN MEPILEX BORDER AG

## (undated) DEVICE — SUTURE MONOCRYL SZ 2-0 L36IN ABSRB UD L36MM CT-1 1/2 CIR Y945H

## (undated) DEVICE — GLOVE SURG SZ 85 L12IN FNGR THK79MIL GRN LTX FREE

## (undated) DEVICE — CLEAN UP KIT: Brand: MEDLINE INDUSTRIES, INC.

## (undated) DEVICE — SHEET, DRAPE, SPLIT, STERILE: Brand: MEDLINE

## (undated) DEVICE — BIPOLAR SEALER 23-112-1 AQM 6.0: Brand: AQUAMANTYS ®

## (undated) DEVICE — ADHESIVE SKIN CLOSURE XL 42 CM 2.7 CC MESH LIQUIBAND SECUR

## (undated) DEVICE — KIT DRP FOR RIO ROBOTIC ARM ASST SYS